# Patient Record
Sex: FEMALE | Race: WHITE | Employment: OTHER | URBAN - METROPOLITAN AREA
[De-identification: names, ages, dates, MRNs, and addresses within clinical notes are randomized per-mention and may not be internally consistent; named-entity substitution may affect disease eponyms.]

---

## 2017-03-06 ENCOUNTER — TELEPHONE (OUTPATIENT)
Dept: FAMILY MEDICINE CLINIC | Age: 67
End: 2017-03-06

## 2017-03-06 DIAGNOSIS — E11.9 CONTROLLED TYPE 2 DIABETES MELLITUS WITHOUT COMPLICATION, WITHOUT LONG-TERM CURRENT USE OF INSULIN (HCC): Primary | ICD-10-CM

## 2017-03-08 LAB — HBA1C MFR BLD: 7.2 %

## 2017-03-21 DIAGNOSIS — E11.9 CONTROLLED TYPE 2 DIABETES MELLITUS WITHOUT COMPLICATION, WITHOUT LONG-TERM CURRENT USE OF INSULIN (HCC): Primary | ICD-10-CM

## 2017-03-22 ENCOUNTER — OFFICE VISIT (OUTPATIENT)
Dept: FAMILY MEDICINE CLINIC | Age: 67
End: 2017-03-22

## 2017-03-22 VITALS
BODY MASS INDEX: 47.09 KG/M2 | WEIGHT: 293 LBS | HEART RATE: 86 BPM | HEIGHT: 66 IN | RESPIRATION RATE: 12 BRPM | SYSTOLIC BLOOD PRESSURE: 134 MMHG | DIASTOLIC BLOOD PRESSURE: 76 MMHG | TEMPERATURE: 96.1 F

## 2017-03-22 DIAGNOSIS — E11.9 CONTROLLED TYPE 2 DIABETES MELLITUS WITHOUT COMPLICATION, WITHOUT LONG-TERM CURRENT USE OF INSULIN (HCC): Primary | ICD-10-CM

## 2017-03-22 DIAGNOSIS — E78.2 MIXED HYPERLIPIDEMIA: ICD-10-CM

## 2017-03-22 DIAGNOSIS — I10 ESSENTIAL HYPERTENSION: ICD-10-CM

## 2017-03-22 PROCEDURE — G8484 FLU IMMUNIZE NO ADMIN: HCPCS | Performed by: FAMILY MEDICINE

## 2017-03-22 PROCEDURE — 4040F PNEUMOC VAC/ADMIN/RCVD: CPT | Performed by: FAMILY MEDICINE

## 2017-03-22 PROCEDURE — 1123F ACP DISCUSS/DSCN MKR DOCD: CPT | Performed by: FAMILY MEDICINE

## 2017-03-22 PROCEDURE — G8419 CALC BMI OUT NRM PARAM NOF/U: HCPCS | Performed by: FAMILY MEDICINE

## 2017-03-22 PROCEDURE — 1090F PRES/ABSN URINE INCON ASSESS: CPT | Performed by: FAMILY MEDICINE

## 2017-03-22 PROCEDURE — 3014F SCREEN MAMMO DOC REV: CPT | Performed by: FAMILY MEDICINE

## 2017-03-22 PROCEDURE — 1036F TOBACCO NON-USER: CPT | Performed by: FAMILY MEDICINE

## 2017-03-22 PROCEDURE — 3045F PR MOST RECENT HEMOGLOBIN A1C LEVEL 7.0-9.0%: CPT | Performed by: FAMILY MEDICINE

## 2017-03-22 PROCEDURE — 3017F COLORECTAL CA SCREEN DOC REV: CPT | Performed by: FAMILY MEDICINE

## 2017-03-22 PROCEDURE — 99213 OFFICE O/P EST LOW 20 MIN: CPT | Performed by: FAMILY MEDICINE

## 2017-03-22 PROCEDURE — G8400 PT W/DXA NO RESULTS DOC: HCPCS | Performed by: FAMILY MEDICINE

## 2017-03-22 PROCEDURE — G8427 DOCREV CUR MEDS BY ELIG CLIN: HCPCS | Performed by: FAMILY MEDICINE

## 2017-03-22 RX ORDER — DIAZEPAM 5 MG/1
TABLET ORAL
Qty: 30 TABLET | Refills: 3 | Status: SHIPPED | OUTPATIENT
Start: 2017-03-22 | End: 2018-02-20 | Stop reason: ALTCHOICE

## 2017-03-22 RX ORDER — BENAZEPRIL HYDROCHLORIDE 20 MG/1
TABLET ORAL
Qty: 90 TABLET | Refills: 3 | Status: SHIPPED | OUTPATIENT
Start: 2017-03-22 | End: 2018-01-19 | Stop reason: SDUPTHER

## 2017-03-22 ASSESSMENT — ENCOUNTER SYMPTOMS
SINUS PRESSURE: 0
DIARRHEA: 0
SORE THROAT: 0
EYES NEGATIVE: 1
ABDOMINAL PAIN: 0
COUGH: 0
SHORTNESS OF BREATH: 0
CONSTIPATION: 0

## 2017-04-03 ENCOUNTER — TELEPHONE (OUTPATIENT)
Dept: FAMILY MEDICINE CLINIC | Age: 67
End: 2017-04-03

## 2017-04-03 RX ORDER — BENZONATATE 100 MG/1
100 CAPSULE ORAL 3 TIMES DAILY PRN
Qty: 30 CAPSULE | Refills: 0 | Status: SHIPPED | OUTPATIENT
Start: 2017-04-03 | End: 2017-04-10

## 2017-04-03 RX ORDER — AZITHROMYCIN 250 MG/1
TABLET, FILM COATED ORAL
Qty: 6 TABLET | Refills: 0 | Status: SHIPPED | OUTPATIENT
Start: 2017-04-03 | End: 2017-11-13 | Stop reason: ALTCHOICE

## 2017-04-11 RX ORDER — OMEPRAZOLE 20 MG/1
CAPSULE, DELAYED RELEASE ORAL
Qty: 180 CAPSULE | Refills: 0 | Status: SHIPPED | OUTPATIENT
Start: 2017-04-11 | End: 2017-07-27 | Stop reason: SDUPTHER

## 2017-04-28 RX ORDER — CITALOPRAM 20 MG/1
TABLET ORAL
Qty: 90 TABLET | Refills: 0 | Status: SHIPPED | OUTPATIENT
Start: 2017-04-28 | End: 2017-11-13 | Stop reason: SDUPTHER

## 2017-07-10 RX ORDER — PIOGLITAZONEHYDROCHLORIDE 45 MG/1
TABLET ORAL
Qty: 90 TABLET | Refills: 0 | Status: SHIPPED | OUTPATIENT
Start: 2017-07-10

## 2017-07-27 RX ORDER — OMEPRAZOLE 20 MG/1
CAPSULE, DELAYED RELEASE ORAL
Qty: 180 CAPSULE | Refills: 1 | Status: SHIPPED | OUTPATIENT
Start: 2017-07-27 | End: 2018-02-23 | Stop reason: SDUPTHER

## 2017-08-11 RX ORDER — GLIMEPIRIDE 4 MG/1
TABLET ORAL
Qty: 90 TABLET | Refills: 1 | Status: SHIPPED | OUTPATIENT
Start: 2017-08-11 | End: 2018-09-16 | Stop reason: SDUPTHER

## 2017-08-11 RX ORDER — CITALOPRAM 20 MG/1
TABLET ORAL
Qty: 90 TABLET | Refills: 1 | Status: SHIPPED | OUTPATIENT
Start: 2017-08-11 | End: 2018-03-09 | Stop reason: SDUPTHER

## 2017-09-08 RX ORDER — DIAZEPAM 5 MG/1
TABLET ORAL
Qty: 30 TABLET | Refills: 0 | OUTPATIENT
Start: 2017-09-08

## 2017-09-08 RX ORDER — LORAZEPAM 0.5 MG/1
TABLET ORAL
Qty: 30 TABLET | Refills: 0 | OUTPATIENT
Start: 2017-09-08

## 2017-11-13 ENCOUNTER — OFFICE VISIT (OUTPATIENT)
Dept: FAMILY MEDICINE CLINIC | Age: 67
End: 2017-11-13

## 2017-11-13 VITALS
RESPIRATION RATE: 20 BRPM | TEMPERATURE: 97.5 F | HEART RATE: 88 BPM | HEIGHT: 66 IN | SYSTOLIC BLOOD PRESSURE: 114 MMHG | BODY MASS INDEX: 47.09 KG/M2 | WEIGHT: 293 LBS | DIASTOLIC BLOOD PRESSURE: 82 MMHG

## 2017-11-13 DIAGNOSIS — E66.01 MORBID OBESITY (HCC): ICD-10-CM

## 2017-11-13 DIAGNOSIS — H65.93 SEROMUCINOUS OTITIS MEDIA OF BOTH EARS: ICD-10-CM

## 2017-11-13 DIAGNOSIS — I10 ESSENTIAL HYPERTENSION: Primary | ICD-10-CM

## 2017-11-13 DIAGNOSIS — E78.2 MIXED HYPERLIPIDEMIA: ICD-10-CM

## 2017-11-13 DIAGNOSIS — L30.9 ECZEMA, UNSPECIFIED TYPE: ICD-10-CM

## 2017-11-13 DIAGNOSIS — F32.0 MILD SINGLE CURRENT EPISODE OF MAJOR DEPRESSIVE DISORDER (HCC): ICD-10-CM

## 2017-11-13 DIAGNOSIS — Z12.11 SPECIAL SCREENING FOR MALIGNANT NEOPLASM OF COLON: ICD-10-CM

## 2017-11-13 DIAGNOSIS — Z12.11 COLON CANCER SCREENING: ICD-10-CM

## 2017-11-13 DIAGNOSIS — E11.9 CONTROLLED TYPE 2 DIABETES MELLITUS WITHOUT COMPLICATION, WITHOUT LONG-TERM CURRENT USE OF INSULIN (HCC): ICD-10-CM

## 2017-11-13 DIAGNOSIS — R25.1 TREMOR: ICD-10-CM

## 2017-11-13 PROCEDURE — G8417 CALC BMI ABV UP PARAM F/U: HCPCS | Performed by: FAMILY MEDICINE

## 2017-11-13 PROCEDURE — 3017F COLORECTAL CA SCREEN DOC REV: CPT | Performed by: FAMILY MEDICINE

## 2017-11-13 PROCEDURE — 4040F PNEUMOC VAC/ADMIN/RCVD: CPT | Performed by: FAMILY MEDICINE

## 2017-11-13 PROCEDURE — 1123F ACP DISCUSS/DSCN MKR DOCD: CPT | Performed by: FAMILY MEDICINE

## 2017-11-13 PROCEDURE — 99214 OFFICE O/P EST MOD 30 MIN: CPT | Performed by: FAMILY MEDICINE

## 2017-11-13 PROCEDURE — 3014F SCREEN MAMMO DOC REV: CPT | Performed by: FAMILY MEDICINE

## 2017-11-13 PROCEDURE — 1090F PRES/ABSN URINE INCON ASSESS: CPT | Performed by: FAMILY MEDICINE

## 2017-11-13 PROCEDURE — G8484 FLU IMMUNIZE NO ADMIN: HCPCS | Performed by: FAMILY MEDICINE

## 2017-11-13 PROCEDURE — G8400 PT W/DXA NO RESULTS DOC: HCPCS | Performed by: FAMILY MEDICINE

## 2017-11-13 PROCEDURE — 1036F TOBACCO NON-USER: CPT | Performed by: FAMILY MEDICINE

## 2017-11-13 PROCEDURE — G8427 DOCREV CUR MEDS BY ELIG CLIN: HCPCS | Performed by: FAMILY MEDICINE

## 2017-11-13 PROCEDURE — 3045F PR MOST RECENT HEMOGLOBIN A1C LEVEL 7.0-9.0%: CPT | Performed by: FAMILY MEDICINE

## 2017-11-13 RX ORDER — DIAZEPAM 5 MG/1
TABLET ORAL
Qty: 30 TABLET | Refills: 2 | Status: SHIPPED | OUTPATIENT
Start: 2017-11-13 | End: 2018-02-10 | Stop reason: SDUPTHER

## 2017-11-13 RX ORDER — PRIMIDONE 50 MG/1
TABLET ORAL
Qty: 60 TABLET | Refills: 3 | Status: SHIPPED | OUTPATIENT
Start: 2017-11-13 | End: 2018-06-21 | Stop reason: ALTCHOICE

## 2017-11-13 RX ORDER — CEFUROXIME AXETIL 250 MG/1
250 TABLET ORAL 2 TIMES DAILY
Qty: 20 TABLET | Refills: 0 | Status: SHIPPED | OUTPATIENT
Start: 2017-11-13 | End: 2017-11-23

## 2017-11-13 RX ORDER — LORAZEPAM 0.5 MG/1
TABLET ORAL
Qty: 30 TABLET | Refills: 3 | Status: SHIPPED | OUTPATIENT
Start: 2017-11-13 | End: 2018-08-15 | Stop reason: SDUPTHER

## 2017-11-13 RX ORDER — TRIAMCINOLONE ACETONIDE 1 MG/G
CREAM TOPICAL
Qty: 45 G | Refills: 1 | Status: SHIPPED | OUTPATIENT
Start: 2017-11-13

## 2017-11-13 ASSESSMENT — ENCOUNTER SYMPTOMS
DIARRHEA: 0
EYES NEGATIVE: 1
ABDOMINAL PAIN: 0
SHORTNESS OF BREATH: 0
NAUSEA: 0
CONSTIPATION: 0
COUGH: 0

## 2017-11-13 NOTE — PROGRESS NOTES
Subjective  Rosalie Putnam, 79 y.o. female presents today with:  Chief Complaint   Patient presents with    Diabetes     Patient presents today for a follow-up on Diabetes. Tries to follow a low sugar diet. Does not check her sugar at home.  Ear Fullness     Also complaining of left ear fullness. States her ear constantly \"crackles\", and \"pops\" when she moves her jaw.  Eczema     Notices she has a lot of dry skin around her hair line, face, ears, and arms. Wondering what she can use? HPI    Patient presents today for a follow-up diabetes   Taking current medications which were reviewed. Problem list discussed. Eating okay. Tries to stay active    Overall doing well. Has 2 new problem / question. Visit dry rash around her hairline. Also complaining of some ear fullness    Health Maintenance Is Up-To-Date         No other questions and or concerns for today's visit    Declines Influenza Vaccine. Review of Systems   Constitutional: Negative for activity change, appetite change, fatigue and fever. HENT: Negative for ear pain. Eyes: Negative. Respiratory: Negative for cough and shortness of breath. Cardiovascular: Negative for chest pain and palpitations. Gastrointestinal: Negative for abdominal pain, constipation, diarrhea and nausea. Genitourinary: Negative for dysuria and frequency. Neurological: Negative for dizziness and headaches.          Past Medical History:   Diagnosis Date    Depression     GERD (gastroesophageal reflux disease)     Hyperlipidemia     Hypertension     Obesity     Type II or unspecified type diabetes mellitus without mention of complication, not stated as uncontrolled      Past Surgical History:   Procedure Laterality Date    CARDIAC CATHETERIZATION      SPINE SURGERY       Social History     Social History    Marital status:      Spouse name: N/A    Number of children: 3    Years of education: N/A     Occupational History   Retired     Social History Main Topics    Smoking status: Former Smoker     Types: Cigarettes     Start date: 1/1/1974     Quit date: 3/11/2003    Smokeless tobacco: Never Used    Alcohol use No    Drug use: No    Sexual activity: No     Other Topics Concern    Not on file     Social History Narrative    No narrative on file     Family History   Problem Relation Age of Onset    Cancer Mother      Allergies   Allergen Reactions    Other      SPORONAX     Current Outpatient Prescriptions   Medication Sig Dispense Refill    glimepiride (AMARYL) 4 MG tablet TAKE 1 TABLET BY MOUTH EVERY MORNING 90 tablet 1    citalopram (CELEXA) 20 MG tablet TAKE 1 TABLET BY MOUTH EVERY DAY 90 tablet 1    omeprazole (PRILOSEC) 20 MG delayed release capsule TAKE 2 CAPSULES BY MOUTH EVERY  capsule 1    pioglitazone (ACTOS) 45 MG tablet TAKE 1 TABLET BY MOUTH EVERY DAY 90 tablet 0    benazepril (LOTENSIN) 20 MG tablet TAKE 1 TABLET BY MOUTH EVERY DAY 90 tablet 3    glucose blood VI test strips (ONETOUCH VERIO) strip TEST  each 3    diazepam (VALIUM) 5 MG tablet TAKE ONE TABLET BY MOUTH EVERY EIGHT HOURS AS NEEDED 30 tablet 3    ibuprofen (ADVIL;MOTRIN) 400 MG tablet Take 400 mg by mouth every 4 hours as needed for Pain       No current facility-administered medications for this visit. PMH, Surgical Hx, Family Hx, and Social Hx reviewed and updated. Health Maintenance reviewed. Objective    Vitals:    11/13/17 1407   BP: 114/82   Pulse: 88   Resp: 20   Temp: 97.5 °F (36.4 °C)   TempSrc: Temporal   Weight: (!) 334 lb (151.5 kg)   Height: 5' 6.25\" (1.683 m)       Physical Exam   Constitutional: She appears well-nourished. No distress. HENT:   Nose: Nose normal.   Mouth/Throat: Oropharynx is clear and moist.   Moderate ear wax in the right ear canal which was removed with curette. She tolerated the procedure well   Eyes: Conjunctivae are normal. Pupils are equal, round, and reactive to light. Neck: Normal range of motion. Neck supple. No JVD present. Carotid bruit is not present. No thyromegaly present. Cardiovascular: Normal rate, regular rhythm, normal heart sounds and normal pulses. No murmur heard. Pulmonary/Chest: Effort normal and breath sounds normal. She has no wheezes. Abdominal: Soft. Bowel sounds are normal. She exhibits no distension and no mass. There is no hepatomegaly. There is no tenderness. Musculoskeletal: Normal range of motion. She exhibits no edema. OA changes hands and knees   Lymphadenopathy:     She has no cervical adenopathy. Neurological: She is alert. Skin: Skin is warm. Rash noted. No cyanosis. Dry scaly eczema changes around hairline area   Psychiatric: She has a normal mood and affect. Her speech is normal.       Assessment & Plan   1. Essential hypertension controlled CBC With Auto Differential    Comprehensive Metabolic Panel    Lipid Panel   2. Morbid obesity ([address]     3. Controlled type 2 diabetes mellitus without complication, without long-term current use of insulin (HCC) sstable Microalbumin / Creatinine Urine Ratio    CBC With Auto Differential    Comprehensive Metabolic Panel    Lipid Panel    Hemoglobin A1C   4. Mixed hyperlipidemia  CBC With Auto Differential    Comprehensive Metabolic Panel    Lipid Panel   5. Mild single current episode of major depressive disorder ([address]     6. Colon cancer screening  POCT Fecal Immunochemical Test (FIT)   7. Special screening for malignant neoplasm of colon  [address] [place] [address] - Gastroenterology Lillian Cowper)     No orders of the defined types were placed in this encounter. No orders of the defined types were placed in this encounter.     Medications Discontinued During This Encounter   Medication Reason    azithromycin (ZITHROMAX Z-AUGUSTO) 250 MG tablet Therapy completed    citalopram (CELEXA) 20 MG tablet Duplicate Order    citalopram (CELEXA) 20 MG tablet Duplicate Order    glimepiride

## 2017-11-13 NOTE — PATIENT INSTRUCTIONS
Hypertension / Hyperlipidemia Management    Blood Pressure Log      Tips to manage your condition    1. Keep your blood pressure below 130/80   Make sure your blood pressure is measured at every office visit    2. If you take antihypertensive drug therapy return for follow-up monthly until B/P goal is reached. 3. Follow-up with your physician to have your potassium and creatinine measured every 6 months. 4. Measure your blood pressure at home (use log to track your progress). 5. Keep your LDL (bad) cholesterol level below 130   Make sure your lipids are measured once a year     6. If you take LDL-lowering drug therapy return for follow-up every 6 months    Tips for healthy living    1. Start your day with breakfast  2. Exercise and slowly progress to (brisk walking, bike riding, etc) 30 minutes 3 to 5 days a week. 3. Snack in moderation (limit eating sugary or salty foods to no more than 3 times a week). 4. Eat more grains and vegetables (have no more than 3 servings of fruit daily). 5. Avoid tobacco use. 6. Drink alcohol in moderation (no more than 1 serving daily for woman and no more than 2 servings daily for men)  7. Obtain annual flu shot  8. Refer to patient education handouts given to you today. [facility] Address Phone Website   Jedenedina [facility]  Dept. [address] [phone] [facility] [email or web address]       Weight Management Community Resources   Organization Address Phone Website   [facility] ([facility]) [address] [facility] [address] [facility] [phone] n/a   Weight Watchers Multiple meeting locations throughout [address] [place], [place] [address] [email or web address]     Tops n/a n/a [email or web address]. [email or web address] [address] [facility]  Jenni Bethea, [facility] [address] [phone] [email or web address]    [facility] [address] [place] [address] [facility] [phone] [facility]    [address] [facility], [address] [phone]        Diabetes Management    Blood Sugar Log  Day Breakfast Lunch Dinner    Before 2 hrs After Before 2 hrs After Before 2 hrs After    Time Number Time  Number Time Number Time Number Time  Number Time Number   Sun               Mon               Tues               Wed               Thurs               Fri               Sat                 2. Keep your blood sugar level (A1c) below 7.0%   If your blood sugar is ABOVE 7.0%, get an A1c test every 3 months.  If your blood sugar is BELOW 7.0%, get an A1c test every 6 months. 3. Keep your blood pressure below 130/80   Make sure your blood pressure is measured at every office visit    4. Obtain a foot exam once a year during your doctors visit; also, do foot exam on yourself every week or have someone do it for you     5. Keep your LDL (bad) cholesterol level below 100   Make sure your lipids are measured once a year     6. Obtain a urine test (microalbumin) once a year    7. Obtain an eye exam once a year    8. Learn to stay healthy living with Diabetes   Attend Diabetes Education course if ordered by your physician    9. Exercise and slowly progress to (brisk walking, bike riding, etc) 30 minutes 3 to 5 days a week. 10. Obtain annual flu shot    11. Obtain pneumonia shot if you have not done so  12. Refer to patient education handouts given to you today    Diabetes Management Community Resources   Organization Address Program Phone / [address] Diabetes Self-Management  [phone]   [facility] [address] [place], [place] Diabetes Self-Management  [address] [place] [address]  Diabetes Self-Management  [address] Help with Food [phone]   Partnership for Prescription Assistance n/a

## 2017-11-24 ENCOUNTER — TELEPHONE (OUTPATIENT)
Dept: FAMILY MEDICINE CLINIC | Age: 67
End: 2017-11-24

## 2017-11-24 RX ORDER — FLUCONAZOLE 150 MG/1
150 TABLET ORAL ONCE
Qty: 1 TABLET | Refills: 1 | Status: SHIPPED | OUTPATIENT
Start: 2017-11-24 | End: 2017-11-24

## 2017-11-24 RX ORDER — FLUCONAZOLE 150 MG/1
150 TABLET ORAL ONCE
Qty: 1 TABLET | Refills: 1 | Status: CANCELLED | OUTPATIENT
Start: 2017-11-24 | End: 2017-11-24

## 2017-11-24 NOTE — TELEPHONE ENCOUNTER
Pt called asking if she can have a rx for Diflucan? She seen Dr Ashley Camargo on 11/13/2017 was given an antibiotic. She now has a yeast infection that she tried to treat herself but hasn't helped.   She said she can not wait until Monday when Dr Ashley Camargo returns

## 2017-11-29 RX ORDER — PIOGLITAZONEHYDROCHLORIDE 45 MG/1
TABLET ORAL
Qty: 90 TABLET | Refills: 1 | Status: SHIPPED | OUTPATIENT
Start: 2017-11-29 | End: 2018-02-10 | Stop reason: ALTCHOICE

## 2017-12-22 RX ORDER — ATORVASTATIN CALCIUM 10 MG/1
10 TABLET, FILM COATED ORAL DAILY
Qty: 90 TABLET | Refills: 1 | Status: SHIPPED | OUTPATIENT
Start: 2017-12-22 | End: 2018-09-27 | Stop reason: SDUPTHER

## 2018-01-18 ENCOUNTER — TELEPHONE (OUTPATIENT)
Dept: FAMILY MEDICINE CLINIC | Age: 68
End: 2018-01-18

## 2018-01-18 RX ORDER — AZITHROMYCIN 250 MG/1
TABLET, FILM COATED ORAL
Qty: 6 TABLET | Refills: 0 | Status: SHIPPED | OUTPATIENT
Start: 2018-01-18 | End: 2018-02-10 | Stop reason: ALTCHOICE

## 2018-01-18 NOTE — TELEPHONE ENCOUNTER
Patient called and stated that she has been sick for 8 days. She has had a sore throat, congestion, drainage, cough, and also had a fever for 2 days which has broke. Would like something called into [facility] in [facility].     Please advise

## 2018-01-19 RX ORDER — BENAZEPRIL HYDROCHLORIDE 20 MG/1
TABLET ORAL
Qty: 90 TABLET | Refills: 1 | Status: SHIPPED | OUTPATIENT
Start: 2018-01-19 | End: 2019-03-04 | Stop reason: SDUPTHER

## 2018-01-26 LAB
AVERAGE GLUCOSE: NORMAL
HBA1C MFR BLD: 7.5 %

## 2018-01-27 LAB
CREATININE, URINE: 109.8
MICROALBUMIN/CREAT 24H UR: 42 MG/G{CREAT}
MICROALBUMIN/CREAT UR-RTO: 109.8

## 2018-01-31 DIAGNOSIS — E11.9 CONTROLLED TYPE 2 DIABETES MELLITUS WITHOUT COMPLICATION, WITHOUT LONG-TERM CURRENT USE OF INSULIN (HCC): ICD-10-CM

## 2018-02-02 ENCOUNTER — TELEPHONE (OUTPATIENT)
Dept: FAMILY MEDICINE CLINIC | Age: 68
End: 2018-02-02

## 2018-02-02 NOTE — TELEPHONE ENCOUNTER
Please let her know urine was okay and A1c was 7.5 indicating good sugar control  Keep up the good work and see me in about 4 months

## 2018-02-10 ENCOUNTER — OFFICE VISIT (OUTPATIENT)
Dept: FAMILY MEDICINE CLINIC | Age: 68
End: 2018-02-10
Payer: MEDICARE

## 2018-02-10 VITALS
TEMPERATURE: 97.1 F | HEIGHT: 66 IN | DIASTOLIC BLOOD PRESSURE: 86 MMHG | HEART RATE: 103 BPM | SYSTOLIC BLOOD PRESSURE: 130 MMHG | WEIGHT: 293 LBS | BODY MASS INDEX: 47.09 KG/M2 | OXYGEN SATURATION: 93 % | RESPIRATION RATE: 15 BRPM

## 2018-02-10 DIAGNOSIS — B96.89 ACUTE BACTERIAL SINUSITIS: ICD-10-CM

## 2018-02-10 DIAGNOSIS — Z12.11 SCREENING FOR COLON CANCER: ICD-10-CM

## 2018-02-10 DIAGNOSIS — E66.01 MORBID OBESITY WITH BMI OF 50.0-59.9, ADULT (HCC): ICD-10-CM

## 2018-02-10 DIAGNOSIS — J01.90 ACUTE BACTERIAL SINUSITIS: ICD-10-CM

## 2018-02-10 DIAGNOSIS — E08.21 DIABETES MELLITUS DUE TO UNDERLYING CONDITION WITH DIABETIC NEPHROPATHY, WITHOUT LONG-TERM CURRENT USE OF INSULIN (HCC): ICD-10-CM

## 2018-02-10 DIAGNOSIS — J20.9 ACUTE BRONCHITIS, UNSPECIFIED ORGANISM: Primary | ICD-10-CM

## 2018-02-10 DIAGNOSIS — F32.5 MAJOR DEPRESSION, SINGLE EPISODE, IN COMPLETE REMISSION (HCC): ICD-10-CM

## 2018-02-10 PROCEDURE — 1090F PRES/ABSN URINE INCON ASSESS: CPT | Performed by: FAMILY MEDICINE

## 2018-02-10 PROCEDURE — 1123F ACP DISCUSS/DSCN MKR DOCD: CPT | Performed by: FAMILY MEDICINE

## 2018-02-10 PROCEDURE — G8484 FLU IMMUNIZE NO ADMIN: HCPCS | Performed by: FAMILY MEDICINE

## 2018-02-10 PROCEDURE — G8417 CALC BMI ABV UP PARAM F/U: HCPCS | Performed by: FAMILY MEDICINE

## 2018-02-10 PROCEDURE — 3014F SCREEN MAMMO DOC REV: CPT | Performed by: FAMILY MEDICINE

## 2018-02-10 PROCEDURE — 99213 OFFICE O/P EST LOW 20 MIN: CPT | Performed by: FAMILY MEDICINE

## 2018-02-10 PROCEDURE — 4040F PNEUMOC VAC/ADMIN/RCVD: CPT | Performed by: FAMILY MEDICINE

## 2018-02-10 PROCEDURE — 1036F TOBACCO NON-USER: CPT | Performed by: FAMILY MEDICINE

## 2018-02-10 PROCEDURE — G8510 SCR DEP NEG, NO PLAN REQD: HCPCS | Performed by: FAMILY MEDICINE

## 2018-02-10 PROCEDURE — 3017F COLORECTAL CA SCREEN DOC REV: CPT | Performed by: FAMILY MEDICINE

## 2018-02-10 PROCEDURE — G8400 PT W/DXA NO RESULTS DOC: HCPCS | Performed by: FAMILY MEDICINE

## 2018-02-10 PROCEDURE — G8427 DOCREV CUR MEDS BY ELIG CLIN: HCPCS | Performed by: FAMILY MEDICINE

## 2018-02-10 RX ORDER — CEFUROXIME AXETIL 250 MG/1
250 TABLET ORAL 2 TIMES DAILY
Qty: 20 TABLET | Refills: 0 | Status: SHIPPED | OUTPATIENT
Start: 2018-02-10 | End: 2018-02-20 | Stop reason: ALTCHOICE

## 2018-02-10 RX ORDER — BENZONATATE 100 MG/1
100 CAPSULE ORAL 3 TIMES DAILY PRN
Qty: 20 CAPSULE | Refills: 1 | Status: SHIPPED | OUTPATIENT
Start: 2018-02-10 | End: 2018-02-20 | Stop reason: ALTCHOICE

## 2018-02-10 ASSESSMENT — PATIENT HEALTH QUESTIONNAIRE - PHQ9
2. FEELING DOWN, DEPRESSED OR HOPELESS: 0
SUM OF ALL RESPONSES TO PHQ QUESTIONS 1-9: 0
1. LITTLE INTEREST OR PLEASURE IN DOING THINGS: 0
SUM OF ALL RESPONSES TO PHQ9 QUESTIONS 1 & 2: 0

## 2018-02-10 ASSESSMENT — ENCOUNTER SYMPTOMS
EYES NEGATIVE: 1
NAUSEA: 0
SHORTNESS OF BREATH: 0
DIARRHEA: 0
ABDOMINAL PAIN: 0
CONSTIPATION: 0
COUGH: 1

## 2018-02-10 NOTE — PROGRESS NOTES
Subjective  Drake Asa, 76 y.o. female presents today with:  Chief Complaint   Patient presents with    Congestion     pt complains of nasal congestion, started tuesday,, pt has tried mucinex dm with some relief     Cough     pt complains of cough, pt has not tried anything for this           HPI    Patient presents today Complaining of sinus congestion symptoms with mostly dry cough coming on for about 5 days. Denies diarrhea or vomiting. Says the cough is the worst thing especially at night when she is trying to sleep    Taking current medications which were reviewed. Problem list discussed. Eating okay. Overall doing well. Has no other new problem / question. Health Maintenance Is Up-To-Date         No other questions and or concerns for today's visit      Review of Systems   Constitutional: Positive for chills. Negative for activity change, appetite change, fatigue and fever. HENT: Positive for congestion. Negative for ear pain. Eyes: Negative. Respiratory: Positive for cough. Negative for shortness of breath. Cardiovascular: Negative for chest pain and palpitations. Gastrointestinal: Negative for abdominal pain, constipation, diarrhea and nausea. Genitourinary: Negative for dysuria and frequency. Neurological: Negative for dizziness and headaches.          Past Medical History:   Diagnosis Date    Depression     GERD (gastroesophageal reflux disease)     Hyperlipidemia     Hypertension     Obesity     Type II or unspecified type diabetes mellitus without mention of complication, not stated as uncontrolled      Past Surgical History:   Procedure Laterality Date    CARDIAC CATHETERIZATION      SPINE SURGERY       Social History     Social History    Marital status:      Spouse name: N/A    Number of children: 3    Years of education: N/A     Occupational History     Retired     Social History Main Topics    Smoking status: Former Smoker     Types: Cigarettes     Start date: 1/1/1974     Quit date: 3/11/2003    Smokeless tobacco: Never Used    Alcohol use No    Drug use: No    Sexual activity: No     Other Topics Concern    Not on file     Social History Narrative    No narrative on file     Family History   Problem Relation Age of Onset    Cancer Mother      Allergies   Allergen Reactions    Other      SPORONAX     Current Outpatient Prescriptions   Medication Sig Dispense Refill    cefUROXime (CEFTIN) 250 MG tablet Take 1 tablet by mouth 2 times daily for 10 days 20 tablet 0    benzonatate (TESSALON PERLES) 100 MG capsule Take 1 capsule by mouth 3 times daily as needed for Cough 20 capsule 1    benazepril (LOTENSIN) 20 MG tablet TAKE 1 TABLET BY MOUTH EVERY DAY 90 tablet 1    atorvastatin (LIPITOR) 10 MG tablet TAKE 1 TABLET BY MOUTH DAILY 90 tablet 1    LORazepam (ATIVAN) 0.5 MG tablet TAKE 1 TABLET EVERY 6 HOURS AS NEEDED. 30 tablet 3    glucose blood VI test strips (ONETOUCH VERIO) strip TEST  each 3    primidone (MYSOLINE) 50 MG tablet BID FOR TREMOR 60 tablet 3    triamcinolone (KENALOG) 0.1 % cream Apply topically 2 times daily. 45 g 1    glimepiride (AMARYL) 4 MG tablet TAKE 1 TABLET BY MOUTH EVERY MORNING 90 tablet 1    citalopram (CELEXA) 20 MG tablet TAKE 1 TABLET BY MOUTH EVERY DAY 90 tablet 1    omeprazole (PRILOSEC) 20 MG delayed release capsule TAKE 2 CAPSULES BY MOUTH EVERY  capsule 1    pioglitazone (ACTOS) 45 MG tablet TAKE 1 TABLET BY MOUTH EVERY DAY 90 tablet 0    diazepam (VALIUM) 5 MG tablet TAKE ONE TABLET BY MOUTH EVERY EIGHT HOURS AS NEEDED 30 tablet 3    ibuprofen (ADVIL;MOTRIN) 400 MG tablet Take 400 mg by mouth every 4 hours as needed for Pain       No current facility-administered medications for this visit. PMH, Surgical Hx, Family Hx, and Social Hx reviewed and updated. Health Maintenance reviewed.     Objective    Vitals:    02/10/18 1036   BP: 130/86   Pulse: 103   Resp: 15   Temp: 97.1 °F (36.2 °C)   TempSrc: Temporal   SpO2: 93%   Weight: (!) 329 lb (149.2 kg)   Height: 5' 6.25\" (1.683 m)       Physical Exam   Constitutional: She appears well-developed. No distress. HENT:   Head: Normocephalic. Right Ear: Hearing and tympanic membrane normal. No swelling. Left Ear: Hearing and tympanic membrane normal.   Nose: Rhinorrhea present. Right sinus exhibits maxillary sinus tenderness. Left sinus exhibits maxillary sinus tenderness. Mouth/Throat: Posterior oropharyngeal erythema present. Eyes: EOM are normal. Pupils are equal, round, and reactive to light. Neck: Normal range of motion. Neck supple. No thyromegaly present. Cardiovascular: Normal rate, regular rhythm and normal heart sounds. No murmur heard. Pulmonary/Chest: Effort normal. She has no wheezes. Mild upper airway rhonchi   Abdominal: Soft. Bowel sounds are normal. There is no tenderness. Musculoskeletal: She exhibits no edema. Lymphadenopathy:     She has no cervical adenopathy. Skin: No rash noted. Assessment & Plan   1. Acute bronchitis, unspecified organism  cefUROXime (CEFTIN) 250 MG tablet    benzonatate (TESSALON PERLES) 100 MG capsule   2. Screening for colon cancer  [address] [place] [address])   3.  Acute bacterial sinusitis       Orders Placed This Encounter   Procedures   [address] [place] [address])     Referral Priority:   Routine     Referral Type:   Consult for Advice and Opinion     Referral Reason:   Specialty Services Required     Requested Specialty:   Gastroenterology     Number of Visits Requested:   1     Orders Placed This Encounter   Medications    cefUROXime (CEFTIN) 250 MG tablet     Sig: Take 1 tablet by mouth 2 times daily for 10 days     Dispense:  20 tablet     Refill:  0    benzonatate (TESSALON PERLES) 100 MG capsule     Sig: Take 1 capsule by mouth 3 times daily as needed for Cough     Dispense:  20 capsule     Refill:  1     Medications Discontinued During This Encounter   Medication Reason    diazepam (VALIUM) 5 MG tablet Duplicate Order    azithromycin (ZITHROMAX Z-AUGUSTO) 250 MG tablet Therapy completed    pioglitazone (ACTOS) 45 MG tablet Therapy completed     No Follow-up on file. Quality & Risk Score Accuracy - [employer]    Last edited 02/10/18 10:43 EST by Harjeet Lopez MD        Long talk. Over-the-counter cough and cold medication when necessary  Take medications as prescribed. Follow up as instructed. Call if any problems      Controlled Substances Monitoring:                                Harjeet Lopez MD          Please note this report has been partially produced using speech recognition software  And may cause contain errors related to that system including grammar, punctuation and spelling as well as words and phrases that may seem inappropriate. If there are questions or concerns please feel free to contact me to clarify.

## 2018-02-20 ENCOUNTER — OFFICE VISIT (OUTPATIENT)
Dept: FAMILY MEDICINE CLINIC | Age: 68
End: 2018-02-20
Payer: MEDICARE

## 2018-02-20 VITALS
WEIGHT: 293 LBS | RESPIRATION RATE: 16 BRPM | SYSTOLIC BLOOD PRESSURE: 136 MMHG | BODY MASS INDEX: 47.09 KG/M2 | DIASTOLIC BLOOD PRESSURE: 84 MMHG | HEIGHT: 66 IN | TEMPERATURE: 96.4 F | HEART RATE: 76 BPM

## 2018-02-20 DIAGNOSIS — J20.9 ACUTE BRONCHITIS, UNSPECIFIED ORGANISM: Primary | ICD-10-CM

## 2018-02-20 DIAGNOSIS — I10 ESSENTIAL HYPERTENSION: ICD-10-CM

## 2018-02-20 DIAGNOSIS — E11.9 CONTROLLED TYPE 2 DIABETES MELLITUS WITHOUT COMPLICATION, WITHOUT LONG-TERM CURRENT USE OF INSULIN (HCC): ICD-10-CM

## 2018-02-20 DIAGNOSIS — J06.9 ACUTE UPPER RESPIRATORY INFECTION: ICD-10-CM

## 2018-02-20 PROCEDURE — 3045F PR MOST RECENT HEMOGLOBIN A1C LEVEL 7.0-9.0%: CPT | Performed by: FAMILY MEDICINE

## 2018-02-20 PROCEDURE — G8427 DOCREV CUR MEDS BY ELIG CLIN: HCPCS | Performed by: FAMILY MEDICINE

## 2018-02-20 PROCEDURE — 3017F COLORECTAL CA SCREEN DOC REV: CPT | Performed by: FAMILY MEDICINE

## 2018-02-20 PROCEDURE — 99213 OFFICE O/P EST LOW 20 MIN: CPT | Performed by: FAMILY MEDICINE

## 2018-02-20 PROCEDURE — 1123F ACP DISCUSS/DSCN MKR DOCD: CPT | Performed by: FAMILY MEDICINE

## 2018-02-20 PROCEDURE — 1090F PRES/ABSN URINE INCON ASSESS: CPT | Performed by: FAMILY MEDICINE

## 2018-02-20 PROCEDURE — G8484 FLU IMMUNIZE NO ADMIN: HCPCS | Performed by: FAMILY MEDICINE

## 2018-02-20 PROCEDURE — G8400 PT W/DXA NO RESULTS DOC: HCPCS | Performed by: FAMILY MEDICINE

## 2018-02-20 PROCEDURE — 4040F PNEUMOC VAC/ADMIN/RCVD: CPT | Performed by: FAMILY MEDICINE

## 2018-02-20 PROCEDURE — 3014F SCREEN MAMMO DOC REV: CPT | Performed by: FAMILY MEDICINE

## 2018-02-20 PROCEDURE — 1036F TOBACCO NON-USER: CPT | Performed by: FAMILY MEDICINE

## 2018-02-20 PROCEDURE — G8417 CALC BMI ABV UP PARAM F/U: HCPCS | Performed by: FAMILY MEDICINE

## 2018-02-20 RX ORDER — GUAIFENESIN AND CODEINE PHOSPHATE 100; 10 MG/5ML; MG/5ML
10 SOLUTION ORAL EVERY 4 HOURS PRN
Qty: 180 ML | Refills: 0 | Status: SHIPPED | OUTPATIENT
Start: 2018-02-20 | End: 2018-06-21 | Stop reason: ALTCHOICE

## 2018-02-20 RX ORDER — PREDNISONE 10 MG/1
TABLET ORAL
Qty: 11 TABLET | Refills: 0 | Status: SHIPPED | OUTPATIENT
Start: 2018-02-20 | End: 2018-03-02

## 2018-02-20 RX ORDER — LEVOFLOXACIN 500 MG/1
500 TABLET, FILM COATED ORAL DAILY
Qty: 10 TABLET | Refills: 0 | Status: SHIPPED | OUTPATIENT
Start: 2018-02-20 | End: 2018-03-02

## 2018-02-20 ASSESSMENT — ENCOUNTER SYMPTOMS
NAUSEA: 0
COUGH: 1
SHORTNESS OF BREATH: 0
ABDOMINAL PAIN: 0
DIARRHEA: 1
EYES NEGATIVE: 1
WHEEZING: 1
CONSTIPATION: 0

## 2018-02-20 NOTE — LETTER
MEDICATION AGREEMENT     Vidal Blocker  [birth year]      For certain conditions, multiple classes of medications may be used to help better manage your symptoms, and to improve your ability to function at home, work and in social settings. However, these medications do have risks, which will be discussed with you, including addiction and dependency. The following prescribed medications need frequent monitoring and will require you to partner and assist in your healthcare. Medication  Dose, instructions and quantity as indicated on current prescription bottle Diagnosis/Reason(s) for Taking Category   LORazepam (ATIVAN) 0.5 MG tablet        Anxiety                            Benefits and goals of Controlled Substance Medications: There are two potential goals for your treatment: (1) decreased pain and suffering (2) improved daily life functions. There are many possible treatments for your chronic condition(s), and, in addition to controlled substance medications, we will try alternatives such as physical therapy, yoga, massage, home daily exercise, meditation, relaxation techniques, injections, chiropractic manipulations, surgery, cognitive therapy, hypnosis and many medications that are not habit-forming. Use of controlled substance medications may be helpful, but they are unlikely to resolve all of your symptoms or restore all function. Risks of Controlled Substance Medications:    Opioid pain medications: These medications can lead to problems such as addiction/dependence, sedation, lightheadedness/dizziness, memory issues, falls, constipation, nausea, or vomiting. They may also impair the ability to drive or operate machinery. Additionally, these medications may lower testosterone levels, leading to loss of bone strength, stamina and sex drive.   They may cause problems with breathing, sleep apnea and reduced coughing, which are especially dangerous for patients with lung disease. Overdose or dangerous interactions with alcohol and other medications may occur, leading to death. Hyperalgesia may develop, in which patients receiving opioids for the treatment of pain may actually become more sensitive to certain painful stimuli, and in some cases, experience pain from ordinarily non-painful stimuli. Women between the ages of 14-53 who could become pregnant should carefully weigh the risks and benefits of opioids with their physicians, as these medications increase the risk of pregnancy complications, including miscarriage,  delivery and stillbirth. It is also possible for babies to be born addicted to opioids. Opioid dependence withdrawal symptoms may include; feelings of uneasiness, increased pain, irritability, belly pain, diarrhea, sweats and goose-flesh. Benzodiazepines and non-benzodiazepine sleep medications: These medications can lead to problems such as addiction/dependence, sedation, fatigue, lightheadedness, dizziness, incoordination, falls, depression, hallucinations, and impaired judgment, memory and concentration. The ability to drive and operate machinery may also be affected. Abnormal sleep-related behaviors have been reported, including sleep walking, driving, making telephone calls, eating, or having sex while not fully awake. These medications can suppress breathing and worsen sleep apnea, particularly when combined with alcohol or other sedating medications, potentially leading to death. Dependence withdrawal symptoms may include tremors, anxiety, hallucinations and seizures. Stimulants:  Common adverse effects include addiction/dependence, increased blood pressure and heart rate, decreased appetite, nausea, involuntary weight loss, insomnia, irritability, and headaches.   These risks may increase when these medications are combined with other stimulants, such as caffeine pills or energy drinks, certain weight loss supplements and oral decongestants. Dependence withdrawal symptoms may include depressed mood, loss of interest, suicidal thoughts, anxiety, fatigue, appetite changes and agitation. Testosterone replacement therapy:  Potential side effects include increased risk of stroke and heart attack, blood clots, increased blood pressure, increased cholesterol, enlarged prostate, sleep apnea, irritability/aggression and other mood disorders, and decreased fertility. Other:     1. I understand that I have the following responsibilities:  · I will take medications at the dose and frequency prescribed. · I will not increase or change how I take my medications without the approval of the health care provider who signs this Medication Agreement. · I will arrange for refills at the prescribed interval ONLY during regular office hours. I will not ask for refills earlier than agreed, after-hours, on holidays or on weekends. · I will obtain all refills for these medications at  ·  ________________[facility]  [facility]acy (phone number  ·  ________________________), with full consent for my provider and pharmacist to exchange information in writing or verbally. · I will not request any pain medications or controlled substances from other providers and will inform this provider of all other medications I am taking. · I will inform my other health care providers that I am taking these medications and of the existence of this [id number]. In the event of an emergency, I will provide the same information to the emergency department providers. · I will protect my prescriptions and medications. I understand that lost or misplaced prescriptions will not be replaced. · I will keep medications only for my own use and will not share them with others. I will keep all medications away from children. · I agree to participate in any medical, psychological or psychiatric assessments recommended by my provider. · I will actively participate in any program designed to improve function, including social, physical, psychological and daily or work activities. 2. I will not use illegal or street drugs or another person's prescription. If I have an addiction problem with drugs or alcohol and my provider asks me to enter a program to address this issue, I agree to follow through. Such programs may include:  · 12-Step program and securing a sponsor  · Individual counseling   · Inpatient or outpatient treatment  · Other:_____________________________________________________________________________________________________________________________________________    If in treatment, I will request that a copy of the programs initial evaluation and treatment recommendations be sent to this provider and will not expect refills until that is received. I will also request written monthly updates be sent to this provider to verify my continuing treatment. 3. I will consent to drug screening upon my providers request to assure I am only taking the prescribed drugs, described in this MEDICATION AGREEMENT. I understand that a drug screen is a laboratory test in which a sample of my urine, blood or saliva is checked to see what drugs I have been taking. 4. I agree that I will treat the providers and staff at this office with respect at all times. I will keep all of my scheduled appointments, but if I need to cancel my appointment, I will do so a minimum of 24 hours before it is scheduled. 5. I understand that this provider may stop prescribing the medications listed if:  · I do not show any improvement in pain, or my activity has not improved. · I develop rapid tolerance or loss of improvement, as described in my treatment plan. · I develop significant side effects from the medication.   · My behavior is inconsistent with the responsibilities outlined above,

## 2018-02-20 NOTE — PROGRESS NOTES
Subjective  Cece Guzman, 76 y.o. female presents today with:  Chief Complaint   Patient presents with    URI                HPI  Patient presents today complaining of a cough, chest tightness, wheezing, and congestion x 2 weeks. On 2-10-18 was prescribed Tessalon, and Ceftin. States these did not help. Is very upset she is still sick. Her daughter has Stage 4 Lung Cancer, and starts Chemo today. Patient cannot see her because she is so sick. Coughs so hard she urinates. Has also had uncontrollable diarrhea. Had an accident yesterday. Took a double dose of Imodium last night. Taking current medications which were reviewed. Problem list discussed. Eating okay. Overall doing well. Has no other new problem / question. Health Maintenance Is Up-To-Date         No other questions and or concerns for today's visit      Review of Systems   Constitutional: Negative for activity change, appetite change, fatigue and fever. HENT: Positive for congestion. Negative for ear pain. Eyes: Negative. Respiratory: Positive for cough and wheezing. Negative for shortness of breath. Cardiovascular: Negative for chest pain and palpitations. Gastrointestinal: Positive for diarrhea. Negative for abdominal pain, constipation and nausea. Genitourinary: Negative for dysuria and frequency. Neurological: Negative for dizziness and headaches.          Past Medical History:   Diagnosis Date    Depression     GERD (gastroesophageal reflux disease)     Hyperlipidemia     Hypertension     Obesity     Type II or unspecified type diabetes mellitus without mention of complication, not stated as uncontrolled      Past Surgical History:   Procedure Laterality Date    CARDIAC CATHETERIZATION      SPINE SURGERY       Social History     Social History    Marital status:      Spouse name: N/A    Number of children: 3    Years of education: N/A     Occupational History     Retired     Social History Main Topics    Smoking status: Former Smoker     Types: Cigarettes     Start date: 1/1/1974     Quit date: 3/11/2003    Smokeless tobacco: Never Used    Alcohol use No    Drug use: No    Sexual activity: No     Other Topics Concern    Not on file     Social History Narrative    No narrative on file     Family History   Problem Relation Age of Onset    Cancer Mother      Allergies   Allergen Reactions    Other      SPORONAX     Current Outpatient Prescriptions   Medication Sig Dispense Refill    levofloxacin (LEVAQUIN) 500 MG tablet Take 1 tablet by mouth daily for 10 days 10 tablet 0    predniSONE (DELTASONE) 10 MG tablet Tid for 3 days then 1 daily for 2 days 11 tablet 0    guaiFENesin-codeine (CHERATUSSIN AC) 100-10 MG/5ML syrup Take 10 mLs by mouth every 4 hours as needed for Cough. 180 mL 0    benazepril (LOTENSIN) 20 MG tablet TAKE 1 TABLET BY MOUTH EVERY DAY 90 tablet 1    atorvastatin (LIPITOR) 10 MG tablet TAKE 1 TABLET BY MOUTH DAILY 90 tablet 1    LORazepam (ATIVAN) 0.5 MG tablet TAKE 1 TABLET EVERY 6 HOURS AS NEEDED. 30 tablet 3    glucose blood VI test strips (ONETOUCH VERIO) strip TEST  each 3    primidone (MYSOLINE) 50 MG tablet BID FOR TREMOR 60 tablet 3    triamcinolone (KENALOG) 0.1 % cream Apply topically 2 times daily. 45 g 1    glimepiride (AMARYL) 4 MG tablet TAKE 1 TABLET BY MOUTH EVERY MORNING 90 tablet 1    citalopram (CELEXA) 20 MG tablet TAKE 1 TABLET BY MOUTH EVERY DAY 90 tablet 1    omeprazole (PRILOSEC) 20 MG delayed release capsule TAKE 2 CAPSULES BY MOUTH EVERY  capsule 1    pioglitazone (ACTOS) 45 MG tablet TAKE 1 TABLET BY MOUTH EVERY DAY 90 tablet 0    ibuprofen (ADVIL;MOTRIN) 400 MG tablet Take 400 mg by mouth every 4 hours as needed for Pain       No current facility-administered medications for this visit. PMH, Surgical Hx, Family Hx, and Social Hx reviewed and updated. Health Maintenance reviewed.     Objective    Vitals:    02/20/18 180 mL     Refill:  0     Medications Discontinued During This Encounter   Medication Reason    benzonatate (TESSALON PERLES) 100 MG capsule Therapy completed    cefUROXime (CEFTIN) 250 MG tablet Therapy completed    diazepam (VALIUM) 5 MG tablet Alternate therapy     No Follow-up on file. Long talk. Over-the-counter cold medications when necessary  Take medications as prescribed. Over-the-counter probiotic daily  Call or return to office as needed if these symptoms worsen or fail to improve as anticipated. Follow up as instructed. Call if any problems      Controlled Substances Monitoring:         Harjeet Lopez MD          Please note this report has been partially produced using speech recognition software  And may cause contain errors related to that system including grammar, punctuation and spelling as well as words and phrases that may seem inappropriate. If there are questions or concerns please feel free to contact me to clarify.

## 2018-02-23 ENCOUNTER — TELEPHONE (OUTPATIENT)
Dept: FAMILY MEDICINE CLINIC | Age: 68
End: 2018-02-23

## 2018-02-23 RX ORDER — FLUCONAZOLE 150 MG/1
150 TABLET ORAL ONCE
Qty: 1 TABLET | Refills: 0 | Status: SHIPPED | OUTPATIENT
Start: 2018-02-23 | End: 2018-02-23

## 2018-02-23 RX ORDER — OMEPRAZOLE 20 MG/1
CAPSULE, DELAYED RELEASE ORAL
Qty: 180 CAPSULE | Refills: 1 | Status: SHIPPED | OUTPATIENT
Start: 2018-02-23 | End: 2018-06-21 | Stop reason: ALTCHOICE

## 2018-03-09 RX ORDER — CITALOPRAM 20 MG/1
TABLET ORAL
Qty: 90 TABLET | Refills: 0 | Status: SHIPPED | OUTPATIENT
Start: 2018-03-09 | End: 2018-07-08 | Stop reason: SDUPTHER

## 2018-05-03 ENCOUNTER — TELEPHONE (OUTPATIENT)
Dept: FAMILY MEDICINE CLINIC | Age: 68
End: 2018-05-03

## 2018-05-21 ENCOUNTER — TELEPHONE (OUTPATIENT)
Dept: FAMILY MEDICINE CLINIC | Age: 68
End: 2018-05-21

## 2018-06-21 ENCOUNTER — TELEPHONE (OUTPATIENT)
Dept: FAMILY MEDICINE CLINIC | Age: 68
End: 2018-06-21

## 2018-06-21 ENCOUNTER — OFFICE VISIT (OUTPATIENT)
Dept: FAMILY MEDICINE CLINIC | Age: 68
End: 2018-06-21
Payer: MEDICARE

## 2018-06-21 VITALS
HEIGHT: 66 IN | DIASTOLIC BLOOD PRESSURE: 74 MMHG | RESPIRATION RATE: 18 BRPM | HEART RATE: 75 BPM | WEIGHT: 293 LBS | BODY MASS INDEX: 47.09 KG/M2 | SYSTOLIC BLOOD PRESSURE: 128 MMHG | TEMPERATURE: 97.4 F

## 2018-06-21 DIAGNOSIS — B00.1 COLD SORE: Primary | ICD-10-CM

## 2018-06-21 DIAGNOSIS — E11.9 CONTROLLED TYPE 2 DIABETES MELLITUS WITHOUT COMPLICATION, WITHOUT LONG-TERM CURRENT USE OF INSULIN (HCC): ICD-10-CM

## 2018-06-21 DIAGNOSIS — Z23 NEED FOR PNEUMOCOCCAL VACCINATION: ICD-10-CM

## 2018-06-21 PROCEDURE — 4040F PNEUMOC VAC/ADMIN/RCVD: CPT | Performed by: FAMILY MEDICINE

## 2018-06-21 PROCEDURE — G8427 DOCREV CUR MEDS BY ELIG CLIN: HCPCS | Performed by: FAMILY MEDICINE

## 2018-06-21 PROCEDURE — 99213 OFFICE O/P EST LOW 20 MIN: CPT | Performed by: FAMILY MEDICINE

## 2018-06-21 PROCEDURE — 3017F COLORECTAL CA SCREEN DOC REV: CPT | Performed by: FAMILY MEDICINE

## 2018-06-21 PROCEDURE — 3045F PR MOST RECENT HEMOGLOBIN A1C LEVEL 7.0-9.0%: CPT | Performed by: FAMILY MEDICINE

## 2018-06-21 PROCEDURE — 1101F PT FALLS ASSESS-DOCD LE1/YR: CPT | Performed by: FAMILY MEDICINE

## 2018-06-21 PROCEDURE — 1090F PRES/ABSN URINE INCON ASSESS: CPT | Performed by: FAMILY MEDICINE

## 2018-06-21 PROCEDURE — G8400 PT W/DXA NO RESULTS DOC: HCPCS | Performed by: FAMILY MEDICINE

## 2018-06-21 PROCEDURE — 90670 PCV13 VACCINE IM: CPT | Performed by: FAMILY MEDICINE

## 2018-06-21 PROCEDURE — G8417 CALC BMI ABV UP PARAM F/U: HCPCS | Performed by: FAMILY MEDICINE

## 2018-06-21 PROCEDURE — 1123F ACP DISCUSS/DSCN MKR DOCD: CPT | Performed by: FAMILY MEDICINE

## 2018-06-21 PROCEDURE — G0009 ADMIN PNEUMOCOCCAL VACCINE: HCPCS | Performed by: FAMILY MEDICINE

## 2018-06-21 PROCEDURE — 1036F TOBACCO NON-USER: CPT | Performed by: FAMILY MEDICINE

## 2018-06-21 PROCEDURE — 2022F DILAT RTA XM EVC RTNOPTHY: CPT | Performed by: FAMILY MEDICINE

## 2018-06-21 RX ORDER — VALACYCLOVIR HYDROCHLORIDE 500 MG/1
500 TABLET, FILM COATED ORAL 2 TIMES DAILY
Qty: 20 TABLET | Refills: 1 | Status: SHIPPED | OUTPATIENT
Start: 2018-06-21

## 2018-06-21 RX ORDER — CEPHALEXIN 500 MG/1
500 CAPSULE ORAL 3 TIMES DAILY
Qty: 30 CAPSULE | Refills: 0 | Status: SHIPPED | OUTPATIENT
Start: 2018-06-21 | End: 2018-07-01

## 2018-06-21 RX ORDER — ESOMEPRAZOLE MAGNESIUM 20 MG/1
20 FOR SUSPENSION ORAL DAILY
COMMUNITY

## 2018-06-21 ASSESSMENT — ENCOUNTER SYMPTOMS
EYE ITCHING: 0
CHEST TIGHTNESS: 0
CONSTIPATION: 0
EYE DISCHARGE: 0
SINUS PRESSURE: 0
SORE THROAT: 0
ABDOMINAL PAIN: 0
SHORTNESS OF BREATH: 0
TROUBLE SWALLOWING: 0
DIARRHEA: 0

## 2018-06-21 NOTE — PROGRESS NOTES
Eyes: Conjunctivae are normal. Pupils are equal, round, and reactive to light. Neck: Neck supple. Cardiovascular: Normal rate, regular rhythm and normal heart sounds. No murmur heard. Pulmonary/Chest: Breath sounds normal. She has no wheezes. Abdominal: Soft. She exhibits no mass. There is no tenderness. Lymphadenopathy:     She has no cervical adenopathy. Assessment & Plan    Diagnosis Orders   1. Cold sore  valACYclovir (VALTREX) 500 MG tablet    cephALEXin (KEFLEX) 500 MG capsule   2. Need for pneumococcal vaccination  PREVNAR 13 IM (Pneumococcal conjugate vaccine 13-valent)   3. Controlled type 2 diabetes mellitus without complication, without long-term current use of insulin ([facility] [address]       Orders Placed This Encounter   Procedures    PREVNAR 13 IM (Pneumococcal conjugate vaccine 13-valent)     Orders Placed This Encounter   Medications    valACYclovir (VALTREX) 500 MG tablet     Sig: Take 1 tablet by mouth 2 times daily     Dispense:  20 tablet     Refill:  1    cephALEXin (KEFLEX) 500 MG capsule     Sig: Take 1 capsule by mouth 3 times daily for 10 days     Dispense:  30 capsule     Refill:  0     Medications Discontinued During This Encounter   Medication Reason    guaiFENesin-codeine (CHERATUSSIN AC) 100-10 MG/5ML syrup Therapy completed    ibuprofen (ADVIL;MOTRIN) 400 MG tablet Therapy completed    omeprazole (PRILOSEC) 20 MG delayed release capsule Therapy completed    primidone (MYSOLINE) 50 MG tablet Therapy completed     No Follow-up on file. Long talk. The importance of a good diet and exercise regimen discussed. Take medications as prescribed. Follow up as instructed. Call if any problems             Wilene Harada, MD          Please note this report has been partially produced using speech recognition software  And may cause contain errors related to that system including grammar, punctuation and spelling as well as words and phrases that may seem inappropriate.  If

## 2018-07-08 DIAGNOSIS — F32.0 MILD SINGLE CURRENT EPISODE OF MAJOR DEPRESSIVE DISORDER (HCC): Primary | ICD-10-CM

## 2018-07-09 RX ORDER — CITALOPRAM 20 MG/1
TABLET ORAL
Qty: 90 TABLET | Refills: 1 | Status: SHIPPED | OUTPATIENT
Start: 2018-07-09 | End: 2019-03-04 | Stop reason: SDUPTHER

## 2018-08-03 RX ORDER — PIOGLITAZONEHYDROCHLORIDE 45 MG/1
TABLET ORAL
Qty: 90 TABLET | Refills: 2 | Status: SHIPPED | OUTPATIENT
Start: 2018-08-03

## 2018-08-15 DIAGNOSIS — F41.9 ANXIETY: Primary | ICD-10-CM

## 2018-08-16 RX ORDER — LORAZEPAM 0.5 MG/1
TABLET ORAL
Qty: 30 TABLET | Refills: 0 | OUTPATIENT
Start: 2018-08-16 | End: 2018-09-15

## 2018-09-10 RX ORDER — OMEPRAZOLE 20 MG/1
CAPSULE, DELAYED RELEASE ORAL
Qty: 180 CAPSULE | Refills: 1 | Status: SHIPPED | OUTPATIENT
Start: 2018-09-10 | End: 2019-04-03 | Stop reason: SDUPTHER

## 2018-09-17 RX ORDER — GLIMEPIRIDE 4 MG/1
TABLET ORAL
Qty: 90 TABLET | Refills: 1 | Status: SHIPPED | OUTPATIENT
Start: 2018-09-17

## 2018-09-27 DIAGNOSIS — E78.2 MIXED HYPERLIPIDEMIA: Primary | ICD-10-CM

## 2018-09-28 RX ORDER — ATORVASTATIN CALCIUM 10 MG/1
10 TABLET, FILM COATED ORAL DAILY
Qty: 90 TABLET | Refills: 0 | Status: SHIPPED | OUTPATIENT
Start: 2018-09-28

## 2018-11-26 ENCOUNTER — TELEPHONE (OUTPATIENT)
Dept: FAMILY MEDICINE CLINIC | Age: 68
End: 2018-11-26

## 2018-11-26 DIAGNOSIS — R05.9 COUGH: Primary | ICD-10-CM

## 2018-11-26 RX ORDER — GUAIFENESIN AND CODEINE PHOSPHATE 100; 10 MG/5ML; MG/5ML
10 SOLUTION ORAL EVERY 4 HOURS PRN
Qty: 240 ML | Refills: 0 | Status: SHIPPED | OUTPATIENT
Start: 2018-11-26 | End: 2019-03-04 | Stop reason: ALTCHOICE

## 2018-11-26 RX ORDER — AZITHROMYCIN 250 MG/1
TABLET, FILM COATED ORAL
Qty: 6 TABLET | Refills: 0 | Status: SHIPPED | OUTPATIENT
Start: 2018-11-26 | End: 2019-03-04 | Stop reason: ALTCHOICE

## 2019-01-08 ENCOUNTER — TELEPHONE (OUTPATIENT)
Dept: FAMILY MEDICINE CLINIC | Age: 69
End: 2019-01-08

## 2019-01-08 RX ORDER — BENZONATATE 100 MG/1
100 CAPSULE ORAL 3 TIMES DAILY PRN
Qty: 30 CAPSULE | Refills: 0 | Status: SHIPPED | OUTPATIENT
Start: 2019-01-08 | End: 2019-01-15

## 2019-01-08 RX ORDER — CEFUROXIME AXETIL 250 MG/1
250 TABLET ORAL 2 TIMES DAILY
Qty: 20 TABLET | Refills: 0 | Status: SHIPPED | OUTPATIENT
Start: 2019-01-08 | End: 2019-01-18

## 2019-02-24 LAB
GLUCOSE BLD-MCNC: 125 MG/DL (ref 70–100)
GLUCOSE BLD-MCNC: 130 MG/DL (ref 70–100)

## 2019-02-27 ENCOUNTER — TELEPHONE (OUTPATIENT)
Dept: FAMILY MEDICINE CLINIC | Age: 69
End: 2019-02-27

## 2019-03-04 ENCOUNTER — OFFICE VISIT (OUTPATIENT)
Dept: FAMILY MEDICINE CLINIC | Age: 69
End: 2019-03-04
Payer: MEDICARE

## 2019-03-04 VITALS
TEMPERATURE: 97 F | SYSTOLIC BLOOD PRESSURE: 130 MMHG | DIASTOLIC BLOOD PRESSURE: 80 MMHG | RESPIRATION RATE: 12 BRPM | WEIGHT: 293 LBS | HEIGHT: 66 IN | BODY MASS INDEX: 47.09 KG/M2 | HEART RATE: 72 BPM

## 2019-03-04 DIAGNOSIS — R29.898 LEFT LEG WEAKNESS: ICD-10-CM

## 2019-03-04 DIAGNOSIS — E78.2 MIXED HYPERLIPIDEMIA: ICD-10-CM

## 2019-03-04 DIAGNOSIS — E11.9 CONTROLLED TYPE 2 DIABETES MELLITUS WITHOUT COMPLICATION, WITHOUT LONG-TERM CURRENT USE OF INSULIN (HCC): ICD-10-CM

## 2019-03-04 DIAGNOSIS — F32.0 MILD SINGLE CURRENT EPISODE OF MAJOR DEPRESSIVE DISORDER (HCC): ICD-10-CM

## 2019-03-04 DIAGNOSIS — I10 ESSENTIAL HYPERTENSION: ICD-10-CM
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
## 2019-03-04 DIAGNOSIS — Z91.81 AT HIGH RISK FOR FALLS: ICD-10-CM

## 2019-03-04 PROCEDURE — 1036F TOBACCO NON-USER: CPT | Performed by: FAMILY MEDICINE

## 2019-03-04 PROCEDURE — G8417 CALC BMI ABV UP PARAM F/U: HCPCS | Performed by: FAMILY MEDICINE

## 2019-03-04 PROCEDURE — 1090F PRES/ABSN URINE INCON ASSESS: CPT | Performed by: FAMILY MEDICINE

## 2019-03-04 PROCEDURE — 1123F ACP DISCUSS/DSCN MKR DOCD: CPT | Performed by: FAMILY MEDICINE

## 2019-03-04 PROCEDURE — 4040F PNEUMOC VAC/ADMIN/RCVD: CPT | Performed by: FAMILY MEDICINE

## 2019-03-04 PROCEDURE — 99213 OFFICE O/P EST LOW 20 MIN: CPT | Performed by: FAMILY MEDICINE

## 2019-03-04 PROCEDURE — G8427 DOCREV CUR MEDS BY ELIG CLIN: HCPCS | Performed by: FAMILY MEDICINE

## 2019-03-04 PROCEDURE — G8400 PT W/DXA NO RESULTS DOC: HCPCS | Performed by: FAMILY MEDICINE

## 2019-03-04 PROCEDURE — 3017F COLORECTAL CA SCREEN DOC REV: CPT | Performed by: FAMILY MEDICINE

## 2019-03-04 PROCEDURE — G8484 FLU IMMUNIZE NO ADMIN: HCPCS | Performed by: FAMILY MEDICINE

## 2019-03-04 PROCEDURE — 2022F DILAT RTA XM EVC RTNOPTHY: CPT | Performed by: FAMILY MEDICINE

## 2019-03-04 PROCEDURE — 3044F HG A1C LEVEL LT 7.0%: CPT | Performed by: FAMILY MEDICINE

## 2019-03-04 RX ORDER — BENAZEPRIL HYDROCHLORIDE 20 MG/1
TABLET ORAL
Qty: 90 TABLET | Refills: 3 | Status: SHIPPED | OUTPATIENT
Start: 2019-03-04

## 2019-03-04 RX ORDER — CITALOPRAM 20 MG/1
TABLET ORAL
Qty: 90 TABLET | Refills: 3 | Status: SHIPPED | OUTPATIENT
Start: 2019-03-04

## 2019-03-04 ASSESSMENT — ENCOUNTER SYMPTOMS
NAUSEA: 0
COUGH: 0
SHORTNESS OF BREATH: 0
EYES NEGATIVE: 1
DIARRHEA: 0
CONSTIPATION: 0
ABDOMINAL PAIN: 0

## 2019-03-15 ENCOUNTER — TELEPHONE (OUTPATIENT)
Dept: ADMINISTRATIVE | Age: 69
End: 2019-03-15

## 2019-04-08 RX ORDER — OMEPRAZOLE 20 MG/1
CAPSULE, DELAYED RELEASE ORAL
Qty: 180 CAPSULE | Refills: 0 | Status: SHIPPED | OUTPATIENT
Start: 2019-04-08

## 2019-04-08 NOTE — TELEPHONE ENCOUNTER
Patient was last seen on 3-4-19  Last Prescribed 9-10-18    Medication is pending  Please approve or deny this request

## 2019-10-04 RX ORDER — GLIMEPIRIDE 4 MG/1
TABLET ORAL
Qty: 90 TABLET | Refills: 1 | Status: CANCELLED | OUTPATIENT
Start: 2019-10-04

## 2021-10-06 ENCOUNTER — HOSPITAL ENCOUNTER (OUTPATIENT)
Dept: WOMENS IMAGING | Age: 71
Discharge: HOME | End: 2021-10-08
Payer: MEDICARE

## 2021-10-06 VITALS — BODY MASS INDEX: 52 KG/M2 | HEIGHT: 66 IN

## 2021-10-06 DIAGNOSIS — Z12.31 ENCOUNTER FOR SCREENING MAMMOGRAM FOR BREAST CANCER: ICD-10-CM

## 2021-10-06 PROCEDURE — 77063 BREAST TOMOSYNTHESIS BI: CPT

## 2023-03-19 DIAGNOSIS — G20.A1 PARKINSON DISEASE (MULTI): Primary | ICD-10-CM

## 2023-03-20 NOTE — TELEPHONE ENCOUNTER
Please call and assist patient in scheduling an appointment for further refills of Celexa and Carbidopa-Levodopa. If patient needs a short term supply, please let us know.

## 2023-03-24 RX ORDER — CARBIDOPA AND LEVODOPA 25; 100 MG/1; MG/1
TABLET ORAL
Qty: 180 TABLET | Refills: 0 | Status: SHIPPED | OUTPATIENT
Start: 2023-03-24

## 2023-03-24 RX ORDER — CITALOPRAM 20 MG/1
TABLET, FILM COATED ORAL
Qty: 90 TABLET | Refills: 0 | Status: SHIPPED | OUTPATIENT
Start: 2023-03-24 | End: 2023-12-21 | Stop reason: SDUPTHER

## 2023-03-24 NOTE — TELEPHONE ENCOUNTER
Spoke with patient - she currently has [place] [employer] - we do not take this insurance - she is going to try to change insurances, will call us back once she takes care of that situation - ok on medications currently.    Please cancel orders    Thank you!

## 2023-04-07 DIAGNOSIS — N32.81 OAB (OVERACTIVE BLADDER): ICD-10-CM

## 2023-04-07 RX ORDER — SOLIFENACIN SUCCINATE 5 MG/1
1 TABLET, FILM COATED ORAL DAILY
COMMUNITY
Start: 2021-01-29 | End: 2023-04-07 | Stop reason: SDUPTHER

## 2023-04-07 RX ORDER — SOLIFENACIN SUCCINATE 5 MG/1
5 TABLET, FILM COATED ORAL DAILY
Qty: 30 TABLET | Refills: 0 | Status: SHIPPED | OUTPATIENT
Start: 2023-04-07 | End: 2024-01-23 | Stop reason: SDUPTHER

## 2023-04-07 NOTE — TELEPHONE ENCOUNTER
Rx Refill Request Telephone Encounter    Name:  Sofya Dunaway  : 1950     Medication Name:  SOLIFENACIN SUCCINATE   Dose (Optional):    5MG  Quantity (Optional):    SHORT SUPPLY  Directions (Optional):   TAKE 1 TABLET DAILY    ALLERGIES:   SPORANOX    [facility]    Date of last appointment:  9/15/2022  Date of next appointment:  2023    Best number to reach patient:  [phone]

## 2023-04-19 ENCOUNTER — APPOINTMENT (OUTPATIENT)
Dept: PRIMARY CARE | Facility: CLINIC | Age: 73
End: 2023-04-19
Payer: MEDICAID

## 2023-05-02 ENCOUNTER — APPOINTMENT (OUTPATIENT)
Dept: PRIMARY CARE | Facility: CLINIC | Age: 73
End: 2023-05-02
Payer: MEDICAID

## 2023-06-16 ENCOUNTER — TELEPHONE (OUTPATIENT)
Dept: PRIMARY CARE | Facility: CLINIC | Age: 73
End: 2023-06-16
Payer: MEDICARE

## 2023-06-16 DIAGNOSIS — M79.89 LEG SWELLING: Primary | ICD-10-CM

## 2023-06-16 RX ORDER — TRIAMTERENE/HYDROCHLOROTHIAZID 37.5-25 MG
TABLET ORAL
Qty: 30 TABLET | Refills: 2 | Status: SHIPPED | OUTPATIENT
Start: 2023-06-16 | End: 2023-12-21 | Stop reason: SDUPTHER

## 2023-06-16 NOTE — TELEPHONE ENCOUNTER
Triamterene not on current list, available to reconcile or in the old system. Please advise if okay  to fill

## 2023-06-16 NOTE — TELEPHONE ENCOUNTER
Rx Refill Request Telephone Encounter    Name:  Sofya Dunaway  : 1950     Medication Name:  Triamterene 37.5 MG  Quantity (Optional):    90  Directions (Optional):   Take .5 three times per week    Specific Pharmacy location:  [facility]    Date of last appointment:    Date of next appointment:  23    Patient aware Dr. Tobias is out of the office until Monday. Ok waiting on refill

## 2023-06-30 ENCOUNTER — APPOINTMENT (OUTPATIENT)
Dept: PRIMARY CARE | Facility: CLINIC | Age: 73
End: 2023-06-30
Payer: MEDICARE

## 2023-07-25 ENCOUNTER — APPOINTMENT (OUTPATIENT)
Dept: PRIMARY CARE | Facility: CLINIC | Age: 73
End: 2023-07-25
Payer: MEDICARE

## 2023-08-08 PROBLEM — K21.9 GASTROESOPHAGEAL REFLUX DISEASE WITHOUT ESOPHAGITIS: Status: ACTIVE | Noted: 2023-08-08

## 2023-08-08 PROBLEM — R32 BLADDER INCONTINENCE: Status: ACTIVE | Noted: 2023-08-08

## 2023-08-08 PROBLEM — F32.A MILD DEPRESSION: Status: ACTIVE | Noted: 2023-08-08

## 2023-08-08 PROBLEM — E78.5 HYPERLIPIDEMIA: Status: ACTIVE | Noted: 2023-08-08

## 2023-08-08 PROBLEM — G25.0 TREMOR, ESSENTIAL: Status: ACTIVE | Noted: 2023-08-08

## 2023-08-08 PROBLEM — R60.0 LOCALIZED EDEMA: Status: ACTIVE | Noted: 2023-08-08

## 2023-08-08 PROBLEM — M15.0 PRIMARY OSTEOARTHRITIS INVOLVING MULTIPLE JOINTS: Status: ACTIVE | Noted: 2023-08-08

## 2023-08-08 PROBLEM — J02.9 SORE THROAT: Status: ACTIVE | Noted: 2023-08-08

## 2023-08-08 PROBLEM — F41.9 ANXIETY: Status: ACTIVE | Noted: 2023-08-08

## 2023-08-08 PROBLEM — R51.9 FREQUENT HEADACHES: Status: ACTIVE | Noted: 2023-08-08

## 2023-08-08 PROBLEM — L40.9 PSORIASIS: Status: ACTIVE | Noted: 2023-08-08

## 2023-08-08 PROBLEM — R53.83 FATIGUE: Status: ACTIVE | Noted: 2023-08-08

## 2023-08-08 PROBLEM — M62.838 MUSCLE SPASM: Status: ACTIVE | Noted: 2023-08-08

## 2023-08-08 PROBLEM — I10 HTN (HYPERTENSION): Status: ACTIVE | Noted: 2023-08-08

## 2023-08-08 PROBLEM — R42 DIZZINESS: Status: ACTIVE | Noted: 2023-08-08

## 2023-08-08 PROBLEM — M15.9 PRIMARY OSTEOARTHRITIS INVOLVING MULTIPLE JOINTS: Status: ACTIVE | Noted: 2023-08-08

## 2023-08-08 PROBLEM — L30.9 ECZEMA: Status: ACTIVE | Noted: 2023-08-08

## 2023-08-08 PROBLEM — E11.9 TYPE 2 DIABETES MELLITUS WITHOUT COMPLICATION, WITHOUT LONG-TERM CURRENT USE OF INSULIN (MULTI): Status: ACTIVE | Noted: 2023-08-08

## 2023-08-08 PROBLEM — J01.90 ACUTE SINUSITIS: Status: ACTIVE | Noted: 2023-08-08

## 2023-08-08 RX ORDER — PIOGLITAZONEHYDROCHLORIDE 45 MG/1
45 TABLET ORAL DAILY
COMMUNITY
End: 2024-01-23 | Stop reason: SDUPTHER

## 2023-08-08 RX ORDER — KETOCONAZOLE 20 MG/G
CREAM TOPICAL
COMMUNITY
Start: 2022-05-18

## 2023-08-08 RX ORDER — HYDROCORTISONE 25 MG/G
CREAM TOPICAL
COMMUNITY
Start: 2022-01-05

## 2023-08-08 RX ORDER — BLOOD SUGAR DIAGNOSTIC
STRIP MISCELLANEOUS
COMMUNITY
Start: 2019-05-03

## 2023-08-08 RX ORDER — BETAMETHASONE DIPROPIONATE 0.5 MG/G
LOTION TOPICAL
COMMUNITY
Start: 2019-08-19

## 2023-08-08 RX ORDER — GLIMEPIRIDE 1 MG/1
TABLET ORAL
COMMUNITY
Start: 2019-12-13

## 2023-08-08 RX ORDER — MULTIVITAMIN
TABLET ORAL
COMMUNITY

## 2023-08-08 RX ORDER — TRIAMCINOLONE ACETONIDE 1 MG/G
CREAM TOPICAL 2 TIMES DAILY
COMMUNITY
Start: 2021-10-05

## 2023-08-08 RX ORDER — ATORVASTATIN CALCIUM 10 MG/1
5 TABLET, FILM COATED ORAL DAILY
COMMUNITY
End: 2023-08-24 | Stop reason: SDUPTHER

## 2023-08-08 RX ORDER — CHOLECALCIFEROL (VITAMIN D3) 125 MCG
CAPSULE ORAL
COMMUNITY

## 2023-08-08 RX ORDER — KETOCONAZOLE 20 MG/ML
SHAMPOO, SUSPENSION TOPICAL
COMMUNITY
Start: 2022-05-18

## 2023-08-08 RX ORDER — OMEPRAZOLE 20 MG/1
40 CAPSULE, DELAYED RELEASE ORAL DAILY
COMMUNITY
End: 2023-08-24 | Stop reason: SDUPTHER

## 2023-08-08 RX ORDER — CLOBETASOL PROPIONATE 0.46 MG/ML
SOLUTION TOPICAL
COMMUNITY
Start: 2022-05-18

## 2023-08-08 NOTE — PROGRESS NOTES
Subjective   Patient ID: Sofya Dunaway is a 73 y.o. female who presents for No chief complaint on file..    HPI    Patient presents today for AWV, HTN, HLD, DM follow up. She does try to stick to low sodium, low cholesterol diet. Does/not exercise. Denies SOB or chest pain.      Taking current medications which were reviewed.  Problem list discussed.    Overall doing well.  Eating okay.  Staying active.    Has no other new problem /question.      ROS  Constitutional- No activity change. No appetite change.  Eyes- Denies vision changes.  Respiratory- No shortness of breath.  Cardiovascular- No palpitations. No chest pain.  GI- No nausea or vomiting. No diarrhea or constipation. Denies abdominal pain.  Musculoskeletal- Denies joint swelling.  Extremities- No edema.  Neurological- Denies headaches. Denies dizziness.  Skin- No rashes.  Psychiatric/Behavioral- Denies significant anxiety, or depressed mood.     Objective     There were no vitals taken for this visit.    Not on File    Constitutional-- Well-nourished.  No distress  Head- unremarkable.  Ears- TMs clear.  Eyes- PERRL.  Conjunctiva normal.  Nose- Normal.  No rhinorrhea noted.  Throat- Oropharynx is clear and moist.  Neck- Supple with no thyromegaly.  No significant cervical adenopathy noted.  Pulmonary/Chest- Breath sounds normal with normal effort.  No wheezing.  Heart- Regular rate and rhythm.  No murmur.  Abdomen- Soft and non-tender.  No masses noted.  Musculoskeletal- Normal ROM.  No significant joint swelling  Extremities- No edema.   Neurological- Alert.  No noted deficits.  Skin- Warm.  No rashes.  Psychiatric/Behavioral- Mood and affect normal.  Behavior normal.     Assessment/Plan   1. Primary hypertension        2. Mixed hyperlipidemia        3. Type 2 diabetes mellitus without complication, without long-term current use of insulin ([facility])               Long talk. Treatment options reviewed.    Continue and take your medications as  prescribed.    Health Maintenance issues discussed.    Importance of healthy diet and regular exercise regimen discussed.    We will contact you with any test results ordered. If you do not hear from us, please contact.    Follow-up as instructed or sooner if any problems or symptoms do not resolve as expected.

## 2023-08-09 ENCOUNTER — APPOINTMENT (OUTPATIENT)
Dept: PRIMARY CARE | Facility: CLINIC | Age: 73
End: 2023-08-09
Payer: MEDICARE

## 2023-08-23 ENCOUNTER — APPOINTMENT (OUTPATIENT)
Dept: PRIMARY CARE | Facility: CLINIC | Age: 73
End: 2023-08-23
Payer: MEDICARE

## 2023-08-24 DIAGNOSIS — K21.9 GASTROESOPHAGEAL REFLUX DISEASE WITHOUT ESOPHAGITIS: ICD-10-CM

## 2023-08-24 DIAGNOSIS — E78.5 HYPERLIPIDEMIA, UNSPECIFIED HYPERLIPIDEMIA TYPE: ICD-10-CM

## 2023-08-24 RX ORDER — ATORVASTATIN CALCIUM 10 MG/1
5 TABLET, FILM COATED ORAL DAILY
Qty: 15 TABLET | Refills: 0 | Status: SHIPPED | OUTPATIENT
Start: 2023-08-24 | End: 2023-09-20

## 2023-08-24 RX ORDER — OMEPRAZOLE 20 MG/1
40 CAPSULE, DELAYED RELEASE ORAL DAILY
Qty: 30 CAPSULE | Refills: 0 | Status: SHIPPED | OUTPATIENT
Start: 2023-08-24 | End: 2023-09-05

## 2023-08-24 NOTE — TELEPHONE ENCOUNTER
PATIENT IS CALLING A SHORT SUPPLY ON:    OMEPRAZOLE 20 MG - 2 CAPSULES BY MOUTH DAILY    ATORVASTATIN 10 MG - 1/2 TABLET DAILY    [facility] IN [place]    LAST OV: 8/24/23    NEXT OV: 9/15/23

## 2023-08-25 ENCOUNTER — OFFICE VISIT (OUTPATIENT)
Dept: PRIMARY CARE | Facility: CLINIC | Age: 73
End: 2023-08-25
Payer: MEDICARE

## 2023-08-25 VITALS
BODY MASS INDEX: 47.09 KG/M2 | TEMPERATURE: 97.5 F | RESPIRATION RATE: 16 BRPM | OXYGEN SATURATION: 96 % | HEART RATE: 69 BPM | WEIGHT: 293 LBS | SYSTOLIC BLOOD PRESSURE: 126 MMHG | DIASTOLIC BLOOD PRESSURE: 82 MMHG | HEIGHT: 66 IN

## 2023-08-25 DIAGNOSIS — M75.51 ACUTE SHOULDER BURSITIS, RIGHT: Primary | ICD-10-CM

## 2023-08-25 DIAGNOSIS — I10 PRIMARY HYPERTENSION: ICD-10-CM

## 2023-08-25 DIAGNOSIS — M79.601 RIGHT ARM PAIN: ICD-10-CM

## 2023-08-25 PROCEDURE — 1159F MED LIST DOCD IN RCRD: CPT | Performed by: FAMILY MEDICINE

## 2023-08-25 PROCEDURE — 3074F SYST BP LT 130 MM HG: CPT | Performed by: FAMILY MEDICINE

## 2023-08-25 PROCEDURE — 3079F DIAST BP 80-89 MM HG: CPT | Performed by: FAMILY MEDICINE

## 2023-08-25 PROCEDURE — 1036F TOBACCO NON-USER: CPT | Performed by: FAMILY MEDICINE

## 2023-08-25 PROCEDURE — 1160F RVW MEDS BY RX/DR IN RCRD: CPT | Performed by: FAMILY MEDICINE

## 2023-08-25 PROCEDURE — 99213 OFFICE O/P EST LOW 20 MIN: CPT | Performed by: FAMILY MEDICINE

## 2023-08-25 RX ORDER — PREDNISONE 10 MG/1
TABLET ORAL
Qty: 30 TABLET | Refills: 0 | Status: SHIPPED | OUTPATIENT
Start: 2023-08-25 | End: 2023-09-05

## 2023-08-25 NOTE — PROGRESS NOTES
"Subjective   Patient ID: Sofya Dunaway is a 73 y.o. female who presents for Arm Pain.    HPI    Patient presents today for right arm pain.  Ongoing for 8 to 9 days  Pt has limited range of motion in the right arm.  Denies injury   Describes the pain as very sharp pain  Pt has  tried OTC ICYHOT and Motrin minimal to some relief       Review of systems  ; Patient seen today for exam denies any problems with headaches or vision, denies any shortness of breath chest pain nausea or vomiting, no black stool no blood in the stool no heartburn type symptoms denies any problems with constipation or diarrhea, and no dysuria-type symptoms    The patient's allergies medications were reviewed with them today    The patient's social family and surgical history or also reviewed here today, along with her past medical history.     Objective     Alert and active in  no acute distress  HEENT TMs clear oropharynx negative nares clear no drainage noted neck supple  With no adenopathy   Heart regular rate and rhythm without murmur and no carotid bruits  Lungs- clear to auscultation bilaterally, no wheeze or rhonchi noted  Thyroid -negative masses or nodularity  Abdomen- soft times four quadrants , bowel sounds positive no masses or organomegaly, negative tenderness guarding or rebound  Neurological exam unremarkable- DTRs in upper and lower extremities within normal limits.   skin -no lesions noted    Right shoulder positive pain with internal/external rotation consistent with bursitis//neurovascular intact all throughout      /82 (BP Location: Left arm, Patient Position: Sitting, BP Cuff Size: Adult)   Pulse 69   Temp 36.4 °C (97.5 °F) (Temporal)   Resp 16   Ht 1.676 m (5' 6\")   Wt 133 kg (294 lb)   SpO2 96%   BMI 47.45 kg/m²     Allergies   Allergen Reactions    Itraconazole Rash       Assessment/Plan   Problem List Items Addressed This Visit       HTN (hypertension)     Other Visit Diagnoses       Acute shoulder " bursitis, right    -  Primary    Relevant Medications    predniSONE (Deltasone) 10 mg tablet    Right arm pain            Went ahead and increase her motion once the steroids kick in I like to use some heat twice a day if things worsen or change she will need an x-ray of her right shoulder and based on that whether it is osteoarthritis possibly an injection or orthopedic opinion      If anything worsens or changes please call us at once, follow up in the office as planned,

## 2023-08-30 ENCOUNTER — TELEPHONE (OUTPATIENT)
Dept: PRIMARY CARE | Facility: CLINIC | Age: 73
End: 2023-08-30
Payer: MEDICARE

## 2023-08-30 DIAGNOSIS — B37.31 YEAST VAGINITIS: Primary | ICD-10-CM

## 2023-08-30 RX ORDER — FLUCONAZOLE 150 MG/1
TABLET ORAL
Qty: 2 TABLET | Refills: 0 | Status: SHIPPED | OUTPATIENT
Start: 2023-08-30 | End: 2024-05-01 | Stop reason: SDUPTHER

## 2023-08-30 NOTE — TELEPHONE ENCOUNTER
Jose Tobias MD  [id number] [place] [id number] Clinical Support Staff34 minutes ago (12:30 PM)       Please give her a call and let her know I pended the yeast infection medication Diflucan.  The computer says she was allergic to something similar for yeast infection.  Please clarify that.  We may have to switch to Lamisil.  Please let her know     I called patient. VM not set up

## 2023-08-30 NOTE — TELEPHONE ENCOUNTER
PATIENT CALLING, SHE SAW DR. BUI ON 08/25/2023 AND WAS PRESCRIBED A STEROID. SHE STATES THAT SHE NOW HAS A YEAST INFECTION AND IS WANTING TO KNOW IF SOMETHING CAN BE CALLED IN FOR THAT. PLEASE ADVISE.

## 2023-08-30 NOTE — TELEPHONE ENCOUNTER
Jose Tobias MD  [id number] [place] [id number] Clinical Support Staff3 minutes ago (4:00 PM)       E prescribed med to their pharmacy.  Call patient and inform.   Patient aware

## 2023-08-30 NOTE — TELEPHONE ENCOUNTER
Pt calling, she states that she is allergic to a toe nail medication, Sporanox. She has used Diflucan in the past. She states that she is not allergic.    Please advise

## 2023-09-05 DIAGNOSIS — K21.9 GASTROESOPHAGEAL REFLUX DISEASE WITHOUT ESOPHAGITIS: ICD-10-CM

## 2023-09-05 RX ORDER — OMEPRAZOLE 20 MG/1
40 CAPSULE, DELAYED RELEASE ORAL DAILY
Qty: 60 CAPSULE | Refills: 5 | Status: SHIPPED | OUTPATIENT
Start: 2023-09-05

## 2023-09-14 NOTE — PROGRESS NOTES
Subjective   Patient ID: Sofya Dunaway is a 73 y.o. female who presents for No chief complaint on file..  HPI  Patient presents today for a Medicare AWV, and follow-up on Diabetes, Hypertension, and Hyperlipidemia. *** follow a low sugar, low sodium, and low fat diet. Exercises ***. *** fasting for blood work.      Taking current medications which were reviewed.  Problem list discussed.    Overall doing well.  Eating okay.  Staying active.    Has no other new problem /question.    ROS  Constitutional- No activity change. No appetite change.  Eyes- Denies vision changes.  Respiratory- No shortness of breath.  Cardiovascular- No palpitations. No chest pain.  GI- No nausea or vomiting. No diarrhea or constipation. Denies abdominal pain.  Musculoskeletal- Denies joint swelling.  Extremities- No edema.  Neurological- Denies headaches. Denies dizziness.  Skin- No rashes.  Psychiatric/Behavioral- Denies significant anxiety, or depressed mood.     Objective     There were no vitals taken for this visit.    Allergies   Allergen Reactions    Itraconazole Rash       Constitutional-- Well-nourished.  No distress  Head- unremarkable.  Ears- TMs clear.  Eyes- PERRL.  Conjunctiva normal.  Nose- Normal.  No rhinorrhea noted.  Throat- Oropharynx is clear and moist.  Neck- Supple with no thyromegaly.  No significant cervical adenopathy noted.  Pulmonary/Chest- Breath sounds normal with normal effort.  No wheezing.  Heart- Regular rate and rhythm.  No murmur.  Abdomen- Soft and non-tender.  No masses noted.  Musculoskeletal- Normal ROM.  No significant joint swelling  Extremities- No edema.   Neurological- Alert.  No noted deficits.  Skin- Warm.  No rashes.  Psychiatric/Behavioral- Mood and affect normal.  Behavior normal.     Assessment/Plan   1. Medicare annual wellness visit, subsequent        2. Primary hypertension        3. Hyperlipidemia, unspecified hyperlipidemia type        4. Anxiety        5. Mild depression        6.  Primary osteoarthritis involving multiple joints        7. Type 2 diabetes mellitus without complication, without long-term current use of insulin (CMS/[facility])               Long talk. Treatment options reviewed.    Medicare wellness questionnaire reviewed in detail.   No problems with activities of daily living. Home safety issues reviewed.   Reminded to have an updated will if needed.    Continue and take your medications as prescribed.    Health Maintenance issues discussed.    Importance of healthy diet and regular exercise regimen discussed.    We will contact you with any test results ordered. If you do not hear from us, please contact.    Follow-up as instructed or sooner if any problems or symptoms do not resolve as expected.

## 2023-09-15 ENCOUNTER — APPOINTMENT (OUTPATIENT)
Dept: PRIMARY CARE | Facility: CLINIC | Age: 73
End: 2023-09-15
Payer: MEDICARE

## 2023-09-20 DIAGNOSIS — E78.5 HYPERLIPIDEMIA, UNSPECIFIED HYPERLIPIDEMIA TYPE: ICD-10-CM

## 2023-09-20 RX ORDER — ATORVASTATIN CALCIUM 10 MG/1
TABLET, FILM COATED ORAL
Qty: 45 TABLET | Refills: 0 | Status: SHIPPED | OUTPATIENT
Start: 2023-09-20 | End: 2024-05-01 | Stop reason: SDUPTHER

## 2023-12-21 DIAGNOSIS — M79.89 LEG SWELLING: ICD-10-CM

## 2023-12-21 DIAGNOSIS — G20.A1 PARKINSON DISEASE (MULTI): ICD-10-CM

## 2023-12-21 RX ORDER — CITALOPRAM 20 MG/1
20 TABLET, FILM COATED ORAL DAILY
Qty: 90 TABLET | Refills: 0 | Status: SHIPPED | OUTPATIENT
Start: 2023-12-21 | End: 2024-02-23 | Stop reason: SDUPTHER

## 2023-12-21 RX ORDER — TRIAMTERENE/HYDROCHLOROTHIAZID 37.5-25 MG
TABLET ORAL
Qty: 30 TABLET | Refills: 1 | Status: SHIPPED | OUTPATIENT
Start: 2023-12-21

## 2023-12-21 NOTE — TELEPHONE ENCOUNTER
Rx Refill Request Telephone Encounter    Name:  Sofya Dunaway  : 1950     Medication Name:  triamterene-hydrochlorothiazid (Maxzide-25) 37.5-25 mg tablet   Quantity (Optional):    30 Refill: 1  Directions (Optional):   1/2 to 1 pill daily as needed for swelling.     Medication Name:  citalopram (CeleXA) 20 mg tablet   Quantity (Optional):    90  Directions (Optional):   TAKE 1 TABLET BY MOUTH EVERY DAY     Specific Pharmacy location:  [id number] [address]   Date of last appointment:

## 2024-01-02 ENCOUNTER — TELEPHONE (OUTPATIENT)
Dept: PRIMARY CARE | Facility: CLINIC | Age: 74
End: 2024-01-02
Payer: MEDICARE

## 2024-01-02 DIAGNOSIS — B37.31 YEAST VAGINITIS: ICD-10-CM

## 2024-01-02 RX ORDER — FLUCONAZOLE 150 MG/1
150 TABLET ORAL ONCE
Qty: 1 TABLET | Refills: 1 | Status: SHIPPED | OUTPATIENT
Start: 2024-01-02 | End: 2024-01-02

## 2024-01-02 NOTE — TELEPHONE ENCOUNTER
Pt calling, she states that she has a yeast infection. She wanted to know if you could call in something for this?     She set up an appointment on 1/19/24 because she hasn't seen you since 8/22    Please advise    Pharmacy: [facility] [place]

## 2024-01-19 ENCOUNTER — APPOINTMENT (OUTPATIENT)
Dept: PRIMARY CARE | Facility: CLINIC | Age: 74
End: 2024-01-19
Payer: MEDICARE

## 2024-01-23 DIAGNOSIS — E11.9 TYPE 2 DIABETES MELLITUS WITHOUT COMPLICATION, WITHOUT LONG-TERM CURRENT USE OF INSULIN (MULTI): ICD-10-CM

## 2024-01-23 DIAGNOSIS — N32.81 OAB (OVERACTIVE BLADDER): ICD-10-CM

## 2024-01-23 RX ORDER — SOLIFENACIN SUCCINATE 5 MG/1
5 TABLET, FILM COATED ORAL DAILY
Qty: 30 TABLET | Refills: 0 | Status: SHIPPED | OUTPATIENT
Start: 2024-01-23 | End: 2024-05-01 | Stop reason: SDUPTHER

## 2024-01-23 RX ORDER — PIOGLITAZONEHYDROCHLORIDE 45 MG/1
45 TABLET ORAL DAILY
Qty: 30 TABLET | Refills: 0 | Status: SHIPPED | OUTPATIENT
Start: 2024-01-23 | End: 2024-05-01 | Stop reason: SDUPTHER

## 2024-01-23 NOTE — TELEPHONE ENCOUNTER
Rx Controlled Refill Request Telephone Encounter    Name: Sofya Dunaway  :  1950    Medication Name:   solifenacin (VESIcare)   Dose (Optional):   5 MG   Quantity (Optional):   30  Directions (Optional):   TAKE 1 TABLET BY MOUTH ONCE DAILY    ALLERGIES:    ON FILE     Specific Pharmacy location:    [facility] [place]     Date of last appointment:    23  Date of next appointment:    24    Best number to reach patient:    [phone]      MEDICATION NAME:  PIOGLITAZONE   DOSAGE:  45 MG TABLET

## 2024-01-29 ENCOUNTER — TELEPHONE (OUTPATIENT)
Dept: PRIMARY CARE | Facility: CLINIC | Age: 74
End: 2024-01-29
Payer: MEDICARE

## 2024-01-29 NOTE — TELEPHONE ENCOUNTER
Pt calling, she wanted to schedule an appointment with you. She mentioned that she was very depressed. Did you want to see her this week sometime?    Please advise

## 2024-02-02 ENCOUNTER — APPOINTMENT (OUTPATIENT)
Dept: PRIMARY CARE | Facility: CLINIC | Age: 74
End: 2024-02-02
Payer: MEDICARE

## 2024-02-23 DIAGNOSIS — G20.A1 PARKINSON DISEASE (MULTI): ICD-10-CM

## 2024-02-23 RX ORDER — CITALOPRAM 20 MG/1
20 TABLET, FILM COATED ORAL DAILY
Qty: 30 TABLET | Refills: 0 | Status: SHIPPED | OUTPATIENT
Start: 2024-02-23 | End: 2024-05-17 | Stop reason: SDUPTHER

## 2024-02-23 NOTE — TELEPHONE ENCOUNTER
Patient scheduled an appointment for 24  Requesting a short supply     Rx Refill Request Telephone Encounter    Name:  Sofya Dunaway  : 1950     Medication Name:  Celexa  Dose (Optional):    20 mg  Quantity (Optional):      Directions (Optional):   Take 1 tablet (20mg) by mouth once daily     ALLERGIES:   see list     Specific Pharmacy location:  [facility]     Date of last appointment:  23  Date of next appointment:  24    Best number to reach patient:  [phone]

## 2024-02-28 ENCOUNTER — APPOINTMENT (OUTPATIENT)
Dept: PRIMARY CARE | Facility: CLINIC | Age: 74
End: 2024-02-28
Payer: MEDICARE

## 2024-02-29 NOTE — PROGRESS NOTES
Subjective   Patient ID: Sofya Dunaway is a 74 y.o. female who presents for No chief complaint on file..  HPI  Patient presents today for a Medicare AWV, and follow-up on Diabetes, Hypertension, and Hyperlipidemia. *** follow a low sugar, low sodium, and low fat diet. *** check her sugar and/or BP at home. Exercises ***. *** fasting for blood work.      Taking current medications which were reviewed.  Problem list discussed.    Overall doing well.  Eating okay.  Staying active.    Has no other new problem /question.    ROS  Constitutional- No activity change. No appetite change.  Eyes- Denies vision changes.  Respiratory- No shortness of breath.  Cardiovascular- No palpitations. No chest pain.  GI- No nausea or vomiting. No diarrhea or constipation. Denies abdominal pain.  Musculoskeletal- Denies joint swelling.  Extremities- No edema.  Neurological- Denies headaches. Denies dizziness.  Skin- No rashes.  Psychiatric/Behavioral- Denies significant anxiety, or depressed mood.     Objective     There were no vitals taken for this visit.    Allergies   Allergen Reactions    Itraconazole Rash       Constitutional-- Well-nourished.  No distress  Head- unremarkable.  Ears- TMs clear.  Eyes- PERRL.  Conjunctiva normal.  Nose- Normal.  No rhinorrhea noted.  Throat- Oropharynx is clear and moist.  Neck- Supple with no thyromegaly.  No significant cervical adenopathy noted.  Pulmonary/Chest- Breath sounds normal with normal effort.  No wheezing.  Heart- Regular rate and rhythm.  No murmur.  Abdomen- Soft and non-tender.  No masses noted.  Musculoskeletal- Normal ROM.  No significant joint swelling  Extremities- No edema.   Neurological- Alert.  No noted deficits.  Skin- Warm.  No rashes.  Psychiatric/Behavioral- Mood and affect normal.  Behavior normal.     Assessment/Plan   1. Medicare annual wellness visit, subsequent        2. Type 2 diabetes mellitus without complication, without long-term current use of insulin  (CMS/[facility])        3. Primary hypertension        4. Hyperlipidemia, unspecified hyperlipidemia type        5. Primary osteoarthritis involving multiple joints               Long talk. Treatment options reviewed.    Medicare wellness questionnaire reviewed in detail.   No problems with activities of daily living. Home safety issues reviewed.   Advanced care planning discussed with patient.  Reminded to have an updated will if needed.    Continue and take your medications as prescribed.    Health Maintenance issues discussed.    Importance of healthy diet and regular exercise regimen discussed.    We will contact you with any test results ordered. If you do not hear from us, please contact.    Follow-up as instructed or sooner if any problems or symptoms do not resolve as expected.

## 2024-03-04 ENCOUNTER — APPOINTMENT (OUTPATIENT)
Dept: PRIMARY CARE | Facility: CLINIC | Age: 74
End: 2024-03-04
Payer: MEDICARE

## 2024-03-25 ENCOUNTER — TELEPHONE (OUTPATIENT)
Dept: PHARMACY | Facility: HOSPITAL | Age: 74
End: 2024-03-25
Payer: MEDICARE

## 2024-03-25 NOTE — TELEPHONE ENCOUNTER
Population Health: Outreach by Ambulatory Pharmacy Team    Payor: René HOLLAND  Reason: Adherence  Medication: pioglitazone 45 mg   Outcome: Left Voicemail    Additional Notes: No recent PCP visit (last 08/2023). No indication that medication was stopped/discontinued. Attempted to contact patient regarding increasing day supply to 100 days with 1 refill, determine if there are any questions/concerns with the medication, or switching medication to  Home Delivery pharmacy.     Lavern Galeana, PharmD

## 2024-04-24 ENCOUNTER — APPOINTMENT (OUTPATIENT)
Dept: PRIMARY CARE | Facility: CLINIC | Age: 74
End: 2024-04-24
Payer: MEDICARE

## 2024-04-24 NOTE — PROGRESS NOTES
Subjective   Patient ID: Sofya Dunaway is a 74 y.o. female who presents for No chief complaint on file..  HPI  Patient presents today for a Medicare AWV, and a follow-up on Diabetes, Hypertension, and Hyperlipidemia. *** follow a low sugar, low sodium, and low fat diet. *** check sugar and/or BP at home. Exercises ***. Is not fasting for blood work.      Taking current medications which were reviewed.  Problem list discussed.    Overall doing well.  Eating okay.  Staying active.    Has no other new problem /question.    ROS  Constitutional- No activity change. No appetite change.  Eyes- Denies vision changes.  Respiratory- No shortness of breath.  Cardiovascular- No palpitations. No chest pain.  GI- No nausea or vomiting. No diarrhea or constipation. Denies abdominal pain.  Musculoskeletal- Denies joint swelling.  Extremities- No edema.  Neurological- Denies headaches. Denies dizziness.  Skin- No rashes.  Psychiatric/Behavioral- Denies significant anxiety, or depressed mood.     Objective     There were no vitals taken for this visit.    Allergies   Allergen Reactions    Itraconazole Rash       Constitutional-- Well-nourished.  No distress  Head- unremarkable.  Ears- TMs clear.  Eyes- PERRL.  Conjunctiva normal.  Nose- Normal.  No rhinorrhea noted.  Throat- Oropharynx is clear and moist.  Neck- Supple with no thyromegaly.  No significant cervical adenopathy noted.  Pulmonary/Chest- Breath sounds normal with normal effort.  No wheezing.  Heart- Regular rate and rhythm.  No murmur.  Abdomen- Soft and non-tender.  No masses noted.  Musculoskeletal- Normal ROM.  No significant joint swelling  Extremities- No edema.   Neurological- Alert.  No noted deficits.  Skin- Warm.  No rashes.  Psychiatric/Behavioral- Mood and affect normal.  Behavior normal.     Assessment/Plan   1. Medicare annual wellness visit, subsequent        2. Type 2 diabetes mellitus without complication, without long-term current use of insulin (Multi)         3. Primary hypertension        4. Hyperlipidemia, unspecified hyperlipidemia type        5. Primary osteoarthritis involving multiple joints        6. Gastroesophageal reflux disease without esophagitis               Long talk. Treatment options reviewed.    Medicare wellness questionnaire reviewed in detail.   No problems with activities of daily living. Home safety issues reviewed.   Advanced care planning discussed with patient.  Reminded to have an updated will if needed.    Continue and take your medications as prescribed.    Health Maintenance issues discussed.    Importance of healthy diet and regular exercise regimen discussed.    We will contact you with any test results ordered. If you do not hear from us, please contact.    Follow-up as instructed or sooner if any problems or symptoms do not resolve as expected.

## 2024-05-01 ENCOUNTER — OFFICE VISIT (OUTPATIENT)
Dept: PRIMARY CARE | Facility: CLINIC | Age: 74
End: 2024-05-01
Payer: MEDICARE

## 2024-05-01 VITALS
WEIGHT: 287.5 LBS | HEIGHT: 66 IN | DIASTOLIC BLOOD PRESSURE: 72 MMHG | RESPIRATION RATE: 18 BRPM | SYSTOLIC BLOOD PRESSURE: 120 MMHG | BODY MASS INDEX: 46.21 KG/M2 | HEART RATE: 87 BPM | OXYGEN SATURATION: 98 %

## 2024-05-01 DIAGNOSIS — M15.9 OSTEOARTHRITIS OF MULTIPLE JOINTS, UNSPECIFIED OSTEOARTHRITIS TYPE: ICD-10-CM

## 2024-05-01 DIAGNOSIS — B37.31 YEAST VAGINITIS: ICD-10-CM

## 2024-05-01 DIAGNOSIS — E11.9 TYPE 2 DIABETES MELLITUS WITHOUT COMPLICATION, WITHOUT LONG-TERM CURRENT USE OF INSULIN (MULTI): ICD-10-CM

## 2024-05-01 DIAGNOSIS — Z12.31 BREAST CANCER SCREENING BY MAMMOGRAM: ICD-10-CM

## 2024-05-01 DIAGNOSIS — Z00.00 MEDICARE ANNUAL WELLNESS VISIT, SUBSEQUENT: Primary | ICD-10-CM

## 2024-05-01 DIAGNOSIS — E78.5 HYPERLIPIDEMIA, UNSPECIFIED HYPERLIPIDEMIA TYPE: ICD-10-CM

## 2024-05-01 DIAGNOSIS — I10 PRIMARY HYPERTENSION: ICD-10-CM

## 2024-05-01 DIAGNOSIS — E78.2 MIXED HYPERLIPIDEMIA: ICD-10-CM

## 2024-05-01 DIAGNOSIS — N32.81 OAB (OVERACTIVE BLADDER): ICD-10-CM

## 2024-05-01 PROCEDURE — G0439 PPPS, SUBSEQ VISIT: HCPCS | Performed by: FAMILY MEDICINE

## 2024-05-01 PROCEDURE — 3008F BODY MASS INDEX DOCD: CPT | Performed by: FAMILY MEDICINE

## 2024-05-01 PROCEDURE — 1170F FXNL STATUS ASSESSED: CPT | Performed by: FAMILY MEDICINE

## 2024-05-01 PROCEDURE — 3078F DIAST BP <80 MM HG: CPT | Performed by: FAMILY MEDICINE

## 2024-05-01 PROCEDURE — 1160F RVW MEDS BY RX/DR IN RCRD: CPT | Performed by: FAMILY MEDICINE

## 2024-05-01 PROCEDURE — 1124F ACP DISCUSS-NO DSCNMKR DOCD: CPT | Performed by: FAMILY MEDICINE

## 2024-05-01 PROCEDURE — 1159F MED LIST DOCD IN RCRD: CPT | Performed by: FAMILY MEDICINE

## 2024-05-01 PROCEDURE — 3074F SYST BP LT 130 MM HG: CPT | Performed by: FAMILY MEDICINE

## 2024-05-01 PROCEDURE — 99213 OFFICE O/P EST LOW 20 MIN: CPT | Performed by: FAMILY MEDICINE

## 2024-05-01 RX ORDER — FLUCONAZOLE 150 MG/1
TABLET ORAL
Qty: 2 TABLET | Refills: 1 | Status: SHIPPED | OUTPATIENT
Start: 2024-05-01

## 2024-05-01 RX ORDER — PIOGLITAZONEHYDROCHLORIDE 45 MG/1
45 TABLET ORAL DAILY
Qty: 30 TABLET | Refills: 3 | Status: SHIPPED | OUTPATIENT
Start: 2024-05-01

## 2024-05-01 RX ORDER — DIAZEPAM 5 MG/1
5 TABLET ORAL EVERY 8 HOURS PRN
COMMUNITY

## 2024-05-01 RX ORDER — ATORVASTATIN CALCIUM 10 MG/1
TABLET, FILM COATED ORAL
Qty: 45 TABLET | Refills: 1 | Status: SHIPPED | OUTPATIENT
Start: 2024-05-01

## 2024-05-01 RX ORDER — SOLIFENACIN SUCCINATE 5 MG/1
5 TABLET, FILM COATED ORAL DAILY
Qty: 30 TABLET | Refills: 3 | Status: SHIPPED | OUTPATIENT
Start: 2024-05-01

## 2024-05-01 RX ORDER — METHYLPREDNISOLONE 4 MG/1
TABLET ORAL
Qty: 21 TABLET | Refills: 0 | Status: SHIPPED | OUTPATIENT
Start: 2024-05-01

## 2024-05-01 RX ORDER — DEXTROSE 4 G
TABLET,CHEWABLE ORAL
Qty: 1 EACH | Refills: 0 | Status: SHIPPED | OUTPATIENT
Start: 2024-05-01

## 2024-05-01 ASSESSMENT — PATIENT HEALTH QUESTIONNAIRE - PHQ9
2. FEELING DOWN, DEPRESSED OR HOPELESS: NOT AT ALL
SUM OF ALL RESPONSES TO PHQ9 QUESTIONS 1 AND 2: 0
1. LITTLE INTEREST OR PLEASURE IN DOING THINGS: NOT AT ALL

## 2024-05-01 ASSESSMENT — ACTIVITIES OF DAILY LIVING (ADL)
BATHING: INDEPENDENT
GROCERY_SHOPPING: INDEPENDENT
MANAGING_FINANCES: INDEPENDENT
DOING_HOUSEWORK: INDEPENDENT
TAKING_MEDICATION: INDEPENDENT
DRESSING: INDEPENDENT

## 2024-05-01 ASSESSMENT — ENCOUNTER SYMPTOMS
DEPRESSION: 0
OCCASIONAL FEELINGS OF UNSTEADINESS: 1
LOSS OF SENSATION IN FEET: 0

## 2024-05-01 NOTE — PROGRESS NOTES
"Subjective   Patient ID: Sofya Dunaway is a 74 y.o. female who presents for Medicare Annual Wellness Visit Subsequent and Anxiety.  HPI    AWV    Anxiety follow up  Taking the Diazepam  Working well    90 % effective   Denies any side effects   OAR[place] reviewed today   Last took unknown    Patient presents in office today for her balance. Patient admits to using a cane and walker at home.     Taking current medications which were reviewed.  Problem list discussed.    Overall doing well.  Eating okay.  Staying active.    Has no other new problem /question.     ROS  Constitutional- No activity change. No appetite change.  Eyes- Denies vision changes.  Respiratory- No shortness of breath.  Cardiovascular- No palpitations. No chest pain.  GI- No nausea or vomiting. No diarrhea or constipation. Denies abdominal pain.  Musculoskeletal- Denies joint swelling.  Extremities- No edema.  Neurological- Denies headaches. Denies dizziness.  Skin- No rashes.  Psychiatric/Behavioral- Denies significant anxiety, or depressed mood.     Objective     /72   Pulse 87   Resp 18   Ht 1.676 m (5' 6\")   Wt 130 kg (287 lb 8 oz)   SpO2 98%   BMI 46.40 kg/m²     Allergies   Allergen Reactions    Itraconazole Rash       Constitutional-- Well-nourished.  No distress  Head- unremarkable.  Ears- TMs clear.  Eyes- PERRL.  Conjunctiva normal.  Nose- Normal.  No rhinorrhea noted.  Throat- Oropharynx is clear and moist.  Neck- Supple with no thyromegaly.  No significant cervical adenopathy noted.  Pulmonary/Chest- Breath sounds normal with normal effort.  No wheezing.  Heart- Regular rate and rhythm.  No murmur.  Abdomen- Soft and non-tender.  No masses noted.  Musculoskeletal- Normal ROM.  No significant joint swelling  Extremities- No edema.   Neurological- Alert.  No noted deficits.  Skin- Warm.  No rashes.  Psychiatric/Behavioral- Mood and affect normal.  Behavior normal.     Assessment/Plan   1. Medicare annual wellness visit, " subsequent        2. Primary hypertension  Comprehensive Metabolic Panel      3. Mixed hyperlipidemia        4. Type 2 diabetes mellitus without complication, without long-term current use of insulin (Multi)  Hemoglobin A1C    Albumin , Urine Random    pioglitazone (Actos) 45 mg tablet    blood-glucose meter misc      5. Breast cancer screening by mammogram  BI mammo bilateral screening tomosynthesis      6. OAB (overactive bladder)  solifenacin (VESIcare) 5 mg tablet      7. Hyperlipidemia, unspecified hyperlipidemia type  atorvastatin (Lipitor) 10 mg tablet      8. Body mass index (BMI) 45.0-49.9, adult (Multi)        9. Osteoarthritis of multiple joints, unspecified osteoarthritis type  methylPREDNISolone (Medrol Dospak) 4 mg tablets      10. Yeast vaginitis  fluconazole (Diflucan) 150 mg tablet             Long talk. Treatment options reviewed.    Medicare wellness questionnaire reviewed in detail. Advanced Directives reviewed today, Importance of having Advance care planing discussed. Patient advised to provide the office with a copy if has not already done so. No problems with activities of daily living. Falls risks reviewed.      Reviewed most recent lab work with patient. Advised patient to remain up to date on routine maintenance and health screening.      Maintain appointments with specialists as scheduled.      Discussed yeast vaginitis. Take Diflucan as discussed.     Discussed hypertension. Will continue to monitor.     Osteoarthritis controlled. Educated the patient on osteoarthritis care and management. Educated on muscle strength and exercise.    Complete blood work as discussed.  Advised patient to remain properly hydrated.     Continue and take your medications as prescribed.    Health Maintenance issues discussed.    Importance of healthy diet and regular exercise regimen discussed.    We will contact you with any test results ordered. If you do not hear from us, please contact.    Follow-up as  instructed or sooner if any problems or symptoms do not resolve as expected.          Scribe Attestation  By signing my name below, ITania Scribe   attest that this documentation has been prepared under the direction and in the presence of Jose Tobias MD.

## 2024-05-17 DIAGNOSIS — G20.A1 PARKINSON DISEASE (MULTI): ICD-10-CM

## 2024-05-17 RX ORDER — CITALOPRAM 20 MG/1
20 TABLET, FILM COATED ORAL DAILY
Qty: 90 TABLET | Refills: 1 | Status: SHIPPED | OUTPATIENT
Start: 2024-05-17

## 2024-07-24 DIAGNOSIS — K21.9 GASTROESOPHAGEAL REFLUX DISEASE WITHOUT ESOPHAGITIS: ICD-10-CM

## 2024-07-24 RX ORDER — OMEPRAZOLE 20 MG/1
40 CAPSULE, DELAYED RELEASE ORAL DAILY
Qty: 60 CAPSULE | Refills: 5 | Status: SHIPPED | OUTPATIENT
Start: 2024-07-24

## 2024-07-29 ENCOUNTER — OFFICE VISIT (OUTPATIENT)
Dept: PRIMARY CARE | Facility: CLINIC | Age: 74
End: 2024-07-29
Payer: MEDICARE

## 2024-07-29 VITALS
SYSTOLIC BLOOD PRESSURE: 124 MMHG | BODY MASS INDEX: 45.1 KG/M2 | HEART RATE: 78 BPM | WEIGHT: 280.6 LBS | OXYGEN SATURATION: 97 % | RESPIRATION RATE: 16 BRPM | DIASTOLIC BLOOD PRESSURE: 84 MMHG | TEMPERATURE: 96.2 F | HEIGHT: 66 IN

## 2024-07-29 DIAGNOSIS — I10 PRIMARY HYPERTENSION: ICD-10-CM

## 2024-07-29 DIAGNOSIS — E78.5 HYPERLIPIDEMIA, UNSPECIFIED HYPERLIPIDEMIA TYPE: ICD-10-CM

## 2024-07-29 DIAGNOSIS — E11.9 TYPE 2 DIABETES MELLITUS WITHOUT COMPLICATION, WITHOUT LONG-TERM CURRENT USE OF INSULIN (MULTI): ICD-10-CM

## 2024-07-29 DIAGNOSIS — M25.559 HIP PAIN, UNSPECIFIED LATERALITY: ICD-10-CM

## 2024-07-29 PROCEDURE — 3074F SYST BP LT 130 MM HG: CPT | Performed by: FAMILY MEDICINE

## 2024-07-29 PROCEDURE — 3079F DIAST BP 80-89 MM HG: CPT | Performed by: FAMILY MEDICINE

## 2024-07-29 PROCEDURE — 1159F MED LIST DOCD IN RCRD: CPT | Performed by: FAMILY MEDICINE

## 2024-07-29 PROCEDURE — 3008F BODY MASS INDEX DOCD: CPT | Performed by: FAMILY MEDICINE

## 2024-07-29 PROCEDURE — 1036F TOBACCO NON-USER: CPT | Performed by: FAMILY MEDICINE

## 2024-07-29 PROCEDURE — 99213 OFFICE O/P EST LOW 20 MIN: CPT | Performed by: FAMILY MEDICINE

## 2024-07-29 RX ORDER — HYDROCODONE BITARTRATE AND ACETAMINOPHEN 5; 325 MG/1; MG/1
1 TABLET ORAL EVERY 8 HOURS PRN
Qty: 21 TABLET | Refills: 0 | Status: SHIPPED | OUTPATIENT
Start: 2024-07-29 | End: 2024-08-05

## 2024-07-29 RX ORDER — METHYLPREDNISOLONE 4 MG/1
TABLET ORAL
Qty: 21 TABLET | Refills: 0 | Status: SHIPPED | OUTPATIENT
Start: 2024-07-29

## 2024-07-29 ASSESSMENT — ENCOUNTER SYMPTOMS
DEPRESSION: 0
LOSS OF SENSATION IN FEET: 0
OCCASIONAL FEELINGS OF UNSTEADINESS: 0

## 2024-07-29 ASSESSMENT — PATIENT HEALTH QUESTIONNAIRE - PHQ9
1. LITTLE INTEREST OR PLEASURE IN DOING THINGS: NOT AT ALL
SUM OF ALL RESPONSES TO PHQ9 QUESTIONS 1 & 2: 0
2. FEELING DOWN, DEPRESSED OR HOPELESS: NOT AT ALL

## 2024-07-29 NOTE — PROGRESS NOTES
Subjective   Patient ID: Sofya Dunaway is a 74 y.o. female who presents for Hip Pain.  HPI    Patient presents in office today for severe hip pain. Ongoing x 4-5 days worse last night. States that this is the right hip. Pain is 8/10. Patient has tried Tylenol OTC.     Review of Systems  Constitutional:  no chills, no fever and no night sweats.  Eyes: no blurred vision and no eyesight problems.  ENT: no hearing loss, no nasal congestion, no hoarseness and no sore throat.  Neck: no mass (es) and no swelling.  Cardiovascular: no chest pain, no intermittent leg claudication, no lower extremity edema, no palpitation and no syncope.  Respiratory: no cough, no shortness of breath during exertion, no shortness of breath at rest and no wheezing.  Gastrointestinal: no abdominal pain, no blood in stools, no constipation, no diarrhea, no melena, no nausea, no rectal pain and no vomiting.  Genitourinary: no dysuria, no change in urinary frequency, no urinary hesitancy and no feelings of urinary urgency.  Musculoskeletal: no arthralgias, no back pain and no myalgias.  Integumentary: no new skin lesions and no rashes.  Neurological: no difficulty walking, no headache, no limb weakness, no numbness and no tingling.  Psychiatric/Behavioral: no anxiety, no depression, no anhedonia and no substance use disorders.  Endocrine: no recent weight gain and no recent weight loss.  Hematologic/Lymphatic: no tendency for easy bruising and no swollen glands    Objective   Physical Exam  Patient in with complaint of right-sided low back pain lateral hip pain.  No trauma no increased activity exam morbidly obese female in no acute distress pain and tenderness right SI joint and paralumbar musculature on the right side pain in the right bursa and lateral thigh musculature.  No fall no injury no increased activity.  She has not tried anything for this except Tylenol.  No other changes from her chronic problems.  There were no vitals taken for  this visit.    Lab Results   Component Value Date    WBC 6.4 08/24/2022    HGB 13.7 08/24/2022    HCT 42.9 08/24/2022    MCV 93 08/24/2022     08/24/2022       Assessment/Plan plan Medrol Dosepak 5 mg hydrocodone every 8 hours as needed heat versus ice whatever feels better if not improving may need physical therapy.  Problem List Items Addressed This Visit       HTN (hypertension)    Hyperlipidemia    Type 2 diabetes mellitus without complication, without long-term current use of insulin (Multi)     Other Visit Diagnoses       Hip pain, unspecified laterality

## 2024-08-05 ENCOUNTER — APPOINTMENT (OUTPATIENT)
Dept: RADIOLOGY | Facility: HOSPITAL | Age: 74
End: 2024-08-05
Payer: MEDICAID

## 2024-08-05 ENCOUNTER — HOSPITAL ENCOUNTER (EMERGENCY)
Facility: HOSPITAL | Age: 74
Discharge: HOME | End: 2024-08-05
Payer: MEDICAID

## 2024-08-05 VITALS
OXYGEN SATURATION: 99 % | TEMPERATURE: 97.9 F | RESPIRATION RATE: 18 BRPM | BODY MASS INDEX: 46.28 KG/M2 | HEIGHT: 66 IN | WEIGHT: 288 LBS | HEART RATE: 70 BPM | SYSTOLIC BLOOD PRESSURE: 121 MMHG | DIASTOLIC BLOOD PRESSURE: 65 MMHG

## 2024-08-05 DIAGNOSIS — M54.40 BACK PAIN OF LUMBOSACRAL REGION WITH SCIATICA: Primary | ICD-10-CM

## 2024-08-05 PROCEDURE — 96372 THER/PROPH/DIAG INJ SC/IM: CPT | Performed by: PHYSICIAN ASSISTANT

## 2024-08-05 PROCEDURE — 2500000001 HC RX 250 WO HCPCS SELF ADMINISTERED DRUGS (ALT 637 FOR MEDICARE OP): Performed by: PHYSICIAN ASSISTANT

## 2024-08-05 PROCEDURE — 99285 EMERGENCY DEPT VISIT HI MDM: CPT

## 2024-08-05 PROCEDURE — 72131 CT LUMBAR SPINE W/O DYE: CPT | Mod: FOREIGN READ | Performed by: RADIOLOGY

## 2024-08-05 PROCEDURE — 2500000004 HC RX 250 GENERAL PHARMACY W/ HCPCS (ALT 636 FOR OP/ED): Performed by: PHYSICIAN ASSISTANT

## 2024-08-05 PROCEDURE — 72131 CT LUMBAR SPINE W/O DYE: CPT

## 2024-08-05 PROCEDURE — 73700 CT LOWER EXTREMITY W/O DYE: CPT | Mod: RT

## 2024-08-05 PROCEDURE — 2500000005 HC RX 250 GENERAL PHARMACY W/O HCPCS: Performed by: PHYSICIAN ASSISTANT

## 2024-08-05 PROCEDURE — 73700 CT LOWER EXTREMITY W/O DYE: CPT | Mod: RIGHT SIDE | Performed by: RADIOLOGY

## 2024-08-05 RX ORDER — NALOXONE HYDROCHLORIDE 4 MG/.1ML
4 SPRAY NASAL AS NEEDED
Qty: 2 EACH | Refills: 0 | Status: SHIPPED | OUTPATIENT
Start: 2024-08-05

## 2024-08-05 RX ORDER — HYDROCODONE BITARTRATE AND ACETAMINOPHEN 5; 325 MG/1; MG/1
1 TABLET ORAL ONCE
Status: COMPLETED | OUTPATIENT
Start: 2024-08-05 | End: 2024-08-05

## 2024-08-05 RX ORDER — ONDANSETRON 4 MG/1
4 TABLET, ORALLY DISINTEGRATING ORAL ONCE
Status: COMPLETED | OUTPATIENT
Start: 2024-08-05 | End: 2024-08-05

## 2024-08-05 RX ORDER — HYDROCODONE BITARTRATE AND ACETAMINOPHEN 5; 325 MG/1; MG/1
1 TABLET ORAL EVERY 6 HOURS PRN
Qty: 12 TABLET | Refills: 0 | Status: SHIPPED | OUTPATIENT
Start: 2024-08-05 | End: 2024-08-08

## 2024-08-05 RX ORDER — PREDNISONE 50 MG/1
50 TABLET ORAL DAILY
Qty: 5 TABLET | Refills: 0 | Status: SHIPPED | OUTPATIENT
Start: 2024-08-05 | End: 2024-08-10

## 2024-08-05 RX ORDER — NAPROXEN SODIUM 550 MG/1
550 TABLET ORAL
Qty: 14 TABLET | Refills: 0 | Status: SHIPPED | OUTPATIENT
Start: 2024-08-05 | End: 2024-08-12

## 2024-08-05 ASSESSMENT — COLUMBIA-SUICIDE SEVERITY RATING SCALE - C-SSRS
6. HAVE YOU EVER DONE ANYTHING, STARTED TO DO ANYTHING, OR PREPARED TO DO ANYTHING TO END YOUR LIFE?: NO
1. IN THE PAST MONTH, HAVE YOU WISHED YOU WERE DEAD OR WISHED YOU COULD GO TO SLEEP AND NOT WAKE UP?: NO
2. HAVE YOU ACTUALLY HAD ANY THOUGHTS OF KILLING YOURSELF?: NO

## 2024-08-05 ASSESSMENT — PAIN - FUNCTIONAL ASSESSMENT: PAIN_FUNCTIONAL_ASSESSMENT: 0-10

## 2024-08-05 ASSESSMENT — LIFESTYLE VARIABLES
EVER HAD A DRINK FIRST THING IN THE MORNING TO STEADY YOUR NERVES TO GET RID OF A HANGOVER: NO
HAVE PEOPLE ANNOYED YOU BY CRITICIZING YOUR DRINKING: NO
TOTAL SCORE: 0
HAVE YOU EVER FELT YOU SHOULD CUT DOWN ON YOUR DRINKING: NO
EVER FELT BAD OR GUILTY ABOUT YOUR DRINKING: NO

## 2024-08-05 ASSESSMENT — PAIN SCALES - GENERAL
PAINLEVEL_OUTOF10: 0 - NO PAIN
PAINLEVEL_OUTOF10: 9
PAINLEVEL_OUTOF10: 5 - MODERATE PAIN

## 2024-08-05 NOTE — ED PROVIDER NOTES
HPI   Chief Complaint   Patient presents with    Hip Pain     Right hip pain denies any injury       A 74-year-old female patient comes in the emergency department today with complaints of severe right hip pain.  States there is no fall or injury.  It really started to bother her.  States she did see her PCP who put her on a Medrol Dosepak which seem to be helping but when she finished the pain came back.  She rates pain 9-10 on the pain scale.  States it is hard to bear weight on the right hip.  She otherwise has no other complaints this present time for this purpose comes in the emergency department today further evaluation.              Patient History   Past Medical History:   Diagnosis Date    Body mass index (BMI) 50.0-59.9, adult (Multi) 10/05/2021    BMI 50.0-59.9, adult    Body mass index (BMI) 50.0-59.9, adult (Multi) 10/05/2021    BMI 50.0-59.9, adult    Disorder of the skin and subcutaneous tissue, unspecified 08/19/2019    Skin lesion of neck    Encounter for general adult medical examination without abnormal findings 01/29/2021    Encounter for Medicare annual wellness exam    Encounter for immunization     Encounter for immunization    Encounter for screening for malignant neoplasm of colon 02/04/2021    Screen for colon cancer    Encounter for screening mammogram for malignant neoplasm of breast     Other screening mammogram    Morbid (severe) obesity due to excess calories (Multi) 10/05/2021    Class 3 severe obesity due to excess calories with serious comorbidity and body mass index (BMI) of 50.0 to 59.9 in adult    Morbid (severe) obesity due to excess calories (Multi) 10/05/2021    Class 3 severe obesity due to excess calories with serious comorbidity and body mass index (BMI) of 50.0 to 59.9 in adult    Personal history of other diseases of the respiratory system 01/29/2020    History of upper respiratory infection    Personal history of other drug therapy 01/29/2021    History of influenza  vaccination    Personal history of other medical treatment 01/29/2021    History of screening mammography     History reviewed. No pertinent surgical history.  Family History   Problem Relation Name Age of Onset    Cancer Mother       Social History     Tobacco Use    Smoking status: Never    Smokeless tobacco: Never   Substance Use Topics    Alcohol use: Never    Drug use: Never       Physical Exam   ED Triage Vitals [08/05/24 1131]   Temperature Heart Rate Respirations BP   36.6 °C (97.9 °F) 93 16 127/58      Pulse Ox Temp src Heart Rate Source Patient Position   98 % -- -- --      BP Location FiO2 (%)     -- --       Physical Exam  Constitutional:       General: She is in acute distress.      Appearance: Normal appearance. She is not ill-appearing.   HENT:      Head: Normocephalic and atraumatic.      Nose: Nose normal.   Eyes:      Extraocular Movements: Extraocular movements intact.      Conjunctiva/sclera: Conjunctivae normal.      Pupils: Pupils are equal, round, and reactive to light.   Cardiovascular:      Rate and Rhythm: Normal rate and regular rhythm.   Pulmonary:      Effort: Pulmonary effort is normal. No respiratory distress.      Breath sounds: Normal breath sounds. No stridor. No wheezing.   Musculoskeletal:         General: Tenderness (Right lateral hip, right SI joint.) present. Normal range of motion.      Cervical back: Normal range of motion.   Skin:     General: Skin is warm and dry.   Neurological:      General: No focal deficit present.      Mental Status: She is alert and oriented to person, place, and time. Mental status is at baseline.   Psychiatric:         Mood and Affect: Mood normal.           ED Course & MDM   Diagnoses as of 08/05/24 1537   Back pain of lumbosacral region with sciatica                       Danna Coma Scale Score: 15                        Medical Decision Making  A 74-year-old female patient comes in the emergency department today with complaints of severe right  hip pain.  States there is no fall or injury.  It really started to bother her.  States she did see her PCP who put her on a Medrol Dosepak which seem to be helping but when she finished the pain came back.  She rates pain 9-10 on the pain scale.  States it is hard to bear weight on the right hip.  She otherwise has no other complaints this present time for this purpose comes in the emergency department today further evaluation.    CT lumbar spine CT right hip ordered to rule out any acute fractures, dislocations, abnormalities.  P.o. Norco p.o. Zofran IM Solu-Medrol ordered for the patient's pain.    Patient had near syncopal episode after IM shot.  Patient recovered well with cool washcloth.    Patient CT lumbar spine shows some degenerative changes of the SI joints.  Some retrolisthesis at L2-3.  I believe patient might be experiencing some sciatic pain.  Patient CT of the hip is negative.  Degenerative changes.  Will discharge patient with follow-up with orthopedics.  Patient agrees with this plan expressed full verbal understanding.  All questions were answered.    Historians patient    Diagnosis: Lumbosacral pain with sciatica    Labs Reviewed - No data to display     CT lumbar spine wo IV contrast   Final Result   1. There is mild degenerative change in both sacroiliac joints.   2. There is mild retrolisthesis at L2-3. 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   3. There is moderate central canal stenosis at L2-3. The left L3-4   neural foramen is narrowed.   Signed by Dalton Aguilar MD      CT hip right wo IV contrast   Final Result   No acute fracture or malalignment.  Degenerative changes as described.   Signed by Malachi James,             Procedure  Procedures     Cory Sandoval PA-C  08/05/24 2120

## 2024-08-06 ENCOUNTER — APPOINTMENT (OUTPATIENT)
Dept: PRIMARY CARE | Facility: CLINIC | Age: 74
End: 2024-08-06
Payer: MEDICARE

## 2024-10-16 ENCOUNTER — TELEPHONE (OUTPATIENT)
Dept: PRIMARY CARE | Facility: CLINIC | Age: 74
End: 2024-10-16
Payer: MEDICARE

## 2024-10-16 DIAGNOSIS — B37.31 YEAST VAGINITIS: ICD-10-CM

## 2024-10-16 RX ORDER — FLUCONAZOLE 150 MG/1
TABLET ORAL
Qty: 2 TABLET | Refills: 0 | Status: SHIPPED | OUTPATIENT
Start: 2024-10-16

## 2024-10-16 NOTE — TELEPHONE ENCOUNTER
Jose Tobias MD  [facility] [id number] [id number] Clinical Support Staff46 minutes ago (12:17 PM)       E prescribed med to their pharmacy.  Call patient and inform.

## 2024-10-16 NOTE — TELEPHONE ENCOUNTER
Patient is calling because she has a yeast infection. She hasn't been seen since 24 and I explained to her that she would need to be seen in order for something to be called in since she hasn't been seen in awhile. Offered the walk in clinic and [facility] (she had quite a few openings today) Sofya states she has to to to a  tomorrow and she has family coming in from out of town today and she has to wait at home for them to come to the house and is unable to leave to come in for an appointment today. She wanted to know if you could call in something for her    Please advise  Thanks      Patient's # [phone]    Preferred [facility]

## 2024-11-01 DIAGNOSIS — E78.5 HYPERLIPIDEMIA, UNSPECIFIED HYPERLIPIDEMIA TYPE: ICD-10-CM

## 2024-11-01 RX ORDER — ATORVASTATIN CALCIUM 10 MG/1
TABLET, FILM COATED ORAL
Qty: 135 TABLET | Refills: 0 | Status: SHIPPED | OUTPATIENT
Start: 2024-11-01

## 2024-11-19 ENCOUNTER — TELEPHONE (OUTPATIENT)
Dept: PRIMARY CARE | Facility: CLINIC | Age: 74
End: 2024-11-19
Payer: MEDICARE

## 2024-11-19 DIAGNOSIS — L84 CALLUS OF FOOT: Primary | ICD-10-CM

## 2024-11-19 NOTE — TELEPHONE ENCOUNTER
Patient is calling because she has had a callus on her foot for about a month now and for a week now it's become pretty painful. She wanted to know if that is something you would be able to deal with or would you refer her to a podiatrist? She does have an appointment scheduled with you already on 11/25/24 for tingling in her left fingers and hand. Did you just want her to wait to address it at that visit? Or refer her to a podiatrist?    Please advise  Thanks    Patient's # [phone]

## 2024-11-19 NOTE — TELEPHONE ENCOUNTER
Jose Tobias MD  Do [id number] [id number] minutes ago (12:28 PM)       Given her situation as well as lack of equipment that we have here I would recommend seeing a podiatrist.  Please refer to 1 as soon as possible.  Thanks

## 2024-11-25 ENCOUNTER — APPOINTMENT (OUTPATIENT)
Dept: PRIMARY CARE | Facility: CLINIC | Age: 74
End: 2024-11-25
Payer: MEDICARE

## 2024-12-24 ENCOUNTER — TELEPHONE (OUTPATIENT)
Dept: PRIMARY CARE | Facility: CLINIC | Age: 74
End: 2024-12-24
Payer: COMMERCIAL

## 2024-12-24 DIAGNOSIS — B37.31 YEAST VAGINITIS: ICD-10-CM

## 2024-12-24 RX ORDER — FLUCONAZOLE 150 MG/1
TABLET ORAL
Qty: 2 TABLET | Refills: 0 | Status: SHIPPED | OUTPATIENT
Start: 2024-12-24

## 2024-12-24 NOTE — TELEPHONE ENCOUNTER
PATIENT IS CALLING - WONDERING IF YOU COULD CALL HER IN SOMETHING FOR A YEAST INFECTION TO [facility] IN [place]? PLEASE ADVISE.    LAST OV: 5/1/24  NEXT OV: 12/27/24

## 2024-12-27 ENCOUNTER — APPOINTMENT (OUTPATIENT)
Dept: PRIMARY CARE | Facility: CLINIC | Age: 74
End: 2024-12-27
Payer: COMMERCIAL

## 2024-12-30 ENCOUNTER — TELEPHONE (OUTPATIENT)
Dept: PRIMARY CARE | Facility: CLINIC | Age: 74
End: 2024-12-30
Payer: COMMERCIAL

## 2024-12-30 DIAGNOSIS — G20.A1 PARKINSON DISEASE (MULTI): ICD-10-CM

## 2024-12-30 DIAGNOSIS — E78.5 HYPERLIPIDEMIA, UNSPECIFIED HYPERLIPIDEMIA TYPE: ICD-10-CM

## 2024-12-30 DIAGNOSIS — E11.9 TYPE 2 DIABETES MELLITUS WITHOUT COMPLICATION, WITHOUT LONG-TERM CURRENT USE OF INSULIN (MULTI): ICD-10-CM

## 2024-12-30 NOTE — TELEPHONE ENCOUNTER
Called patient and went over her medications. Patient is currently taking pioglitazone, atorvastatin, vesicare, carbidopa - levodopa, Celexa and triamterene hydrochlorothiazide 9 prn for leg swelling ). Patient is unsure if she should be taking glimepiride. States that she will need refills on Celexa, atorvastatin, carbidopa - levodopa and pioglitazone.     Per Dr. Tobias: patient is to continue current medication. Needs to get A1c done so we can see where her levels are at then we can decide what medications she needs to be on.     Patient aware and will be getting her A1c done tomorrow.

## 2025-01-02 ENCOUNTER — LAB (OUTPATIENT)
Dept: LAB | Facility: LAB | Age: 75
End: 2025-01-02
Payer: COMMERCIAL

## 2025-01-02 DIAGNOSIS — I10 PRIMARY HYPERTENSION: ICD-10-CM

## 2025-01-02 DIAGNOSIS — E11.9 TYPE 2 DIABETES MELLITUS WITHOUT COMPLICATION, WITHOUT LONG-TERM CURRENT USE OF INSULIN (MULTI): ICD-10-CM

## 2025-01-02 LAB
ALBUMIN SERPL BCP-MCNC: 3.9 G/DL (ref 3.4–5)
ALP SERPL-CCNC: 113 U/L (ref 33–136)
ALT SERPL W P-5'-P-CCNC: 10 U/L (ref 7–45)
ANION GAP SERPL CALC-SCNC: 12 MMOL/L (ref 10–20)
AST SERPL W P-5'-P-CCNC: 10 U/L (ref 9–39)
BILIRUB SERPL-MCNC: 0.6 MG/DL (ref 0–1.2)
BUN SERPL-MCNC: 5 MG/DL (ref 6–23)
CALCIUM SERPL-MCNC: 8.9 MG/DL (ref 8.6–10.3)
CHLORIDE SERPL-SCNC: 101 MMOL/L (ref 98–107)
CO2 SERPL-SCNC: 28 MMOL/L (ref 21–32)
CREAT SERPL-MCNC: 0.7 MG/DL (ref 0.5–1.05)
EGFRCR SERPLBLD CKD-EPI 2021: >90 ML/MIN/1.73M*2
EST. AVERAGE GLUCOSE BLD GHB EST-MCNC: 292 MG/DL
GLUCOSE SERPL-MCNC: 315 MG/DL (ref 74–99)
HBA1C MFR BLD: 11.8 %
POTASSIUM SERPL-SCNC: 3.8 MMOL/L (ref 3.5–5.3)
PROT SERPL-MCNC: 6.8 G/DL (ref 6.4–8.2)
SODIUM SERPL-SCNC: 137 MMOL/L (ref 136–145)

## 2025-01-02 PROCEDURE — 80053 COMPREHEN METABOLIC PANEL: CPT

## 2025-01-02 PROCEDURE — 83036 HEMOGLOBIN GLYCOSYLATED A1C: CPT

## 2025-01-02 NOTE — TELEPHONE ENCOUNTER
----- Message from Jose Tobias sent at 1/2/2025  4:50 PM EST -----  Please let her know her blood sugar is extremely high with an A1c of 11.8 indicating very poor sugar control.  Please arrange for glimepiride 1 mg daily 30 pills with 1 refill.  Follow-up with me in 10 to 14 days either in the morning without eating breakfast or in the afternoon without eating lunch so we can assess how her sugar is doing.  Please let her know and arrange

## 2025-01-03 RX ORDER — GLIMEPIRIDE 1 MG/1
1 TABLET ORAL
Qty: 30 TABLET | Refills: 1 | Status: SHIPPED | OUTPATIENT
Start: 2025-01-03 | End: 2025-05-03

## 2025-01-03 RX ORDER — PIOGLITAZONEHYDROCHLORIDE 45 MG/1
45 TABLET ORAL DAILY
Qty: 30 TABLET | Refills: 3 | Status: SHIPPED | OUTPATIENT
Start: 2025-01-03

## 2025-01-03 RX ORDER — CITALOPRAM 20 MG/1
20 TABLET, FILM COATED ORAL DAILY
Qty: 90 TABLET | Refills: 1 | Status: SHIPPED | OUTPATIENT
Start: 2025-01-03

## 2025-01-03 RX ORDER — ATORVASTATIN CALCIUM 10 MG/1
TABLET, FILM COATED ORAL
Qty: 135 TABLET | Refills: 0 | Status: SHIPPED | OUTPATIENT
Start: 2025-01-03

## 2025-01-03 RX ORDER — CARBIDOPA AND LEVODOPA 25; 100 MG/1; MG/1
1 TABLET ORAL 3 TIMES DAILY
Qty: 180 TABLET | Refills: 1 | Status: SHIPPED | OUTPATIENT
Start: 2025-01-03

## 2025-01-03 NOTE — TELEPHONE ENCOUNTER
Pt is aware and med is pended. Pt is scheduled. Pt also said that hospital was terrible. She would like to not go there again.

## 2025-01-03 NOTE — TELEPHONE ENCOUNTER
Jose Tobias MD  Do [id number] [place] [id number] Clinical Support Staff33 minutes ago (9:13 AM)       E prescribed med to their pharmacy.  Call patient and inform.       [employer] message sent

## 2025-01-14 ENCOUNTER — APPOINTMENT (OUTPATIENT)
Dept: PRIMARY CARE | Facility: CLINIC | Age: 75
End: 2025-01-14
Payer: COMMERCIAL

## 2025-02-07 ENCOUNTER — TELEPHONE (OUTPATIENT)
Dept: PRIMARY CARE | Facility: CLINIC | Age: 75
End: 2025-02-07
Payer: COMMERCIAL

## 2025-02-07 DIAGNOSIS — E11.9 TYPE 2 DIABETES MELLITUS WITHOUT COMPLICATION, WITHOUT LONG-TERM CURRENT USE OF INSULIN (MULTI): ICD-10-CM

## 2025-02-07 NOTE — TELEPHONE ENCOUNTER
Please call and assist patient in scheduling an appointment for further refills glimepiride 1 mg. If patient needs a short term supply, please let us know.

## 2025-05-10 DIAGNOSIS — G20.A1 PARKINSON DISEASE (MULTI): ICD-10-CM

## 2025-05-12 RX ORDER — CARBIDOPA AND LEVODOPA 25; 100 MG/1; MG/1
1 TABLET ORAL 3 TIMES DAILY
Qty: 90 TABLET | Refills: 0 | Status: SHIPPED | OUTPATIENT
Start: 2025-05-12

## 2025-05-13 ENCOUNTER — OFFICE VISIT (OUTPATIENT)
Dept: PRIMARY CARE | Facility: CLINIC | Age: 75
End: 2025-05-13
Payer: MEDICARE

## 2025-05-13 VITALS
HEIGHT: 66 IN | SYSTOLIC BLOOD PRESSURE: 124 MMHG | HEART RATE: 87 BPM | TEMPERATURE: 97.8 F | DIASTOLIC BLOOD PRESSURE: 76 MMHG | BODY MASS INDEX: 43.71 KG/M2 | WEIGHT: 272 LBS | OXYGEN SATURATION: 99 %

## 2025-05-13 DIAGNOSIS — E78.5 HYPERLIPIDEMIA, UNSPECIFIED HYPERLIPIDEMIA TYPE: ICD-10-CM

## 2025-05-13 DIAGNOSIS — I10 PRIMARY HYPERTENSION: ICD-10-CM

## 2025-05-13 DIAGNOSIS — E66.01 OBESITY, MORBID (MULTI): ICD-10-CM

## 2025-05-13 DIAGNOSIS — E11.40 TYPE 2 DIABETES MELLITUS WITH DIABETIC NEUROPATHY, UNSPECIFIED WHETHER LONG TERM INSULIN USE (MULTI): ICD-10-CM

## 2025-05-13 DIAGNOSIS — M25.559 HIP PAIN, UNSPECIFIED LATERALITY: ICD-10-CM

## 2025-05-13 DIAGNOSIS — M77.9 TENDONITIS: Primary | ICD-10-CM

## 2025-05-13 DIAGNOSIS — E11.9 TYPE 2 DIABETES MELLITUS WITHOUT COMPLICATION, WITHOUT LONG-TERM CURRENT USE OF INSULIN: ICD-10-CM

## 2025-05-13 PROCEDURE — 1159F MED LIST DOCD IN RCRD: CPT | Performed by: FAMILY MEDICINE

## 2025-05-13 PROCEDURE — 99213 OFFICE O/P EST LOW 20 MIN: CPT | Performed by: FAMILY MEDICINE

## 2025-05-13 PROCEDURE — 1124F ACP DISCUSS-NO DSCNMKR DOCD: CPT | Performed by: FAMILY MEDICINE

## 2025-05-13 PROCEDURE — 3074F SYST BP LT 130 MM HG: CPT | Performed by: FAMILY MEDICINE

## 2025-05-13 PROCEDURE — 3046F HEMOGLOBIN A1C LEVEL >9.0%: CPT | Performed by: FAMILY MEDICINE

## 2025-05-13 PROCEDURE — G2211 COMPLEX E/M VISIT ADD ON: HCPCS | Performed by: FAMILY MEDICINE

## 2025-05-13 PROCEDURE — 3078F DIAST BP <80 MM HG: CPT | Performed by: FAMILY MEDICINE

## 2025-05-13 RX ORDER — METHYLPREDNISOLONE 4 MG/1
TABLET ORAL
Qty: 21 TABLET | Refills: 0 | Status: SHIPPED | OUTPATIENT
Start: 2025-05-13

## 2025-05-13 NOTE — PROGRESS NOTES
"Subjective   Patient ID: Sofya Dunaway is a 75 y.o. female who presents for Leg Pain, Hypertension, Hyperlipidemia, and Diabetes.  HPI  Patient presents in the office today with concerns of leg pain with issues walking . Pt states she thinks she may have pulled a muscle located on her upper RT thigh. Pt denies any falls Ongoing 3 weeks. Tried massaging, cbd gel and advil.       Taking current medications which were reviewed.  Problem list discussed.    Eating okay.    Has no other new problem /question.  ROS  Constitutional-  No appetite change.  Eyes- Denies vision changes.  Respiratory- No shortness of breath.  Cardiovascular- No palpitations. No chest pain.  GI- No nausea or vomiting. No diarrhea or constipation. Denies abdominal pain.  Musculoskeletal- Denies joint swelling.  Neurological- Denies headaches. Denies dizziness.  Skin- No rashes.  Psychiatric/Behavioral- Denies significant anxiety, or depressed mood.     Objective     /76   Pulse 87   Temp 36.6 °C (97.8 °F) (Temporal)   Ht 1.676 m (5' 6\")   Wt 123 kg (272 lb)   SpO2 99%   BMI 43.90 kg/m²     Allergies[1]    Constitutional-- Well-nourished.  No distress  Head- unremarkable.  Eyes- PERRL.  Conjunctiva normal.  Nose- Normal.  No rhinorrhea noted.  Throat- Oropharynx is clear and moist.  Neck- Supple with no thyromegaly.  No significant cervical adenopathy noted.  Pulmonary/Chest- Breath sounds normal with normal effort.  No wheezing.  Heart- Regular rate and rhythm.  No murmur.  Abdomen- Soft and non-tender.  No masses noted.  Musculoskeletal-lateral thigh pain consistent with tendinitis/bursitis on that side.  Extremities-mild leg swelling to above the ankles chronic  Neurological- Alert.  No noted deficits.  Skin- Warm.  No rashes.  Psychiatric/Behavioral- Mood and affect normal.  Behavior normal.     Assessment/Plan   1. Tendonitis  methylPREDNISolone (Medrol Dospak) 4 mg tablets      2. Hip pain, unspecified laterality  " methylPREDNISolone (Medrol Dospak) 4 mg tablets      3. Type 2 diabetes mellitus without complication, without long-term current use of insulin  Hemoglobin A1C    Comprehensive metabolic panel    Albumin-Creatinine Ratio, Urine Random    Hemoglobin A1C    Comprehensive metabolic panel    Albumin-Creatinine Ratio, Urine Random      4. Hyperlipidemia, unspecified hyperlipidemia type        5. Primary hypertension  Comprehensive metabolic panel    Comprehensive metabolic panel      6. Type 2 diabetes mellitus with diabetic neuropathy, unspecified whether long term insulin use (Multi)        7. Obesity, morbid (Multi)        8. Body mass index (BMI) 45.0-49.9, adult (Multi)               Long talk. Treatment options reviewed.    Reviewed most recent lab work with patient. Advised patient to remain up to date on routine maintenance and health screening.  Maintain appointments with specialists as scheduled.      Discussed tendonitis and related symptoms. Take Methylprednisolone as discussed. Continue to monitor.     Discussed Diabetes Mellitus. Continue to monitor glucose levels. Educated on diabetes, low-calorie diet and exercise. Recommended eye exam once a year. Educated on diabetic foot care.    Blood pressure controlled    Discussed importance of natural sources of nutrition.  Advised patient to consume vegetables, salads, fruits, nuts, and proteins such as fish and chicken.  Discussed portion control.      Discussed the importance of routine stretching and exercise.     Continue and take your medications as prescribed.    Health Maintenance issues discussed.    Importance of healthy diet and regular exercise regimen discussed.    We will contact you with any test results ordered. If you do not hear from us, please contact.    Follow-up as instructed or sooner if any problems or symptoms do not resolve as expected.            Scribe Attestation  By signing my name below, Tania GARCIA Scribe   attest that this  documentation has been prepared under the direction and in the presence of Jose Tobias MD.         [1]   Allergies  Allergen Reactions    Itraconazole Rash

## 2025-05-14 ENCOUNTER — HOSPITAL ENCOUNTER (EMERGENCY)
Facility: HOSPITAL | Age: 75
Discharge: HOME | End: 2025-05-14
Payer: MEDICARE

## 2025-05-14 ENCOUNTER — APPOINTMENT (OUTPATIENT)
Dept: RADIOLOGY | Facility: HOSPITAL | Age: 75
End: 2025-05-14
Payer: MEDICARE

## 2025-05-14 VITALS
WEIGHT: 272 LBS | OXYGEN SATURATION: 97 % | TEMPERATURE: 98.6 F | SYSTOLIC BLOOD PRESSURE: 112 MMHG | DIASTOLIC BLOOD PRESSURE: 53 MMHG | HEART RATE: 65 BPM | HEIGHT: 66 IN | BODY MASS INDEX: 43.71 KG/M2 | RESPIRATION RATE: 18 BRPM

## 2025-05-14 DIAGNOSIS — M46.1 OSTEOARTHRITIS OF RIGHT SACROILIAC JOINT: Primary | ICD-10-CM

## 2025-05-14 PROCEDURE — 96372 THER/PROPH/DIAG INJ SC/IM: CPT | Performed by: NURSE PRACTITIONER

## 2025-05-14 PROCEDURE — 73700 CT LOWER EXTREMITY W/O DYE: CPT | Mod: RT

## 2025-05-14 PROCEDURE — 73700 CT LOWER EXTREMITY W/O DYE: CPT | Mod: RIGHT SIDE | Performed by: RADIOLOGY

## 2025-05-14 PROCEDURE — 2500000004 HC RX 250 GENERAL PHARMACY W/ HCPCS (ALT 636 FOR OP/ED): Performed by: NURSE PRACTITIONER

## 2025-05-14 PROCEDURE — 99284 EMERGENCY DEPT VISIT MOD MDM: CPT | Mod: 25

## 2025-05-14 RX ORDER — PREDNISONE 20 MG/1
40 TABLET ORAL ONCE
Status: COMPLETED | OUTPATIENT
Start: 2025-05-14 | End: 2025-05-14

## 2025-05-14 RX ORDER — KETOROLAC TROMETHAMINE 30 MG/ML
15 INJECTION, SOLUTION INTRAMUSCULAR; INTRAVENOUS ONCE
Status: COMPLETED | OUTPATIENT
Start: 2025-05-14 | End: 2025-05-14

## 2025-05-14 RX ADMIN — PREDNISONE 40 MG: 20 TABLET ORAL at 04:41

## 2025-05-14 RX ADMIN — KETOROLAC TROMETHAMINE 15 MG: 30 INJECTION, SOLUTION INTRAMUSCULAR at 02:39

## 2025-05-14 ASSESSMENT — LIFESTYLE VARIABLES
HAVE YOU EVER FELT YOU SHOULD CUT DOWN ON YOUR DRINKING: NO
HAVE PEOPLE ANNOYED YOU BY CRITICIZING YOUR DRINKING: NO
EVER HAD A DRINK FIRST THING IN THE MORNING TO STEADY YOUR NERVES TO GET RID OF A HANGOVER: NO
EVER FELT BAD OR GUILTY ABOUT YOUR DRINKING: NO
TOTAL SCORE: 0

## 2025-05-14 ASSESSMENT — PAIN - FUNCTIONAL ASSESSMENT
PAIN_FUNCTIONAL_ASSESSMENT: 0-10
PAIN_FUNCTIONAL_ASSESSMENT: 0-10

## 2025-05-14 ASSESSMENT — PAIN SCALES - GENERAL
PAINLEVEL_OUTOF10: 2
PAINLEVEL_OUTOF10: 9

## 2025-05-14 ASSESSMENT — PAIN DESCRIPTION - ORIENTATION: ORIENTATION: RIGHT

## 2025-05-14 ASSESSMENT — COLUMBIA-SUICIDE SEVERITY RATING SCALE - C-SSRS
1. IN THE PAST MONTH, HAVE YOU WISHED YOU WERE DEAD OR WISHED YOU COULD GO TO SLEEP AND NOT WAKE UP?: NO
2. HAVE YOU ACTUALLY HAD ANY THOUGHTS OF KILLING YOURSELF?: NO
6. HAVE YOU EVER DONE ANYTHING, STARTED TO DO ANYTHING, OR PREPARED TO DO ANYTHING TO END YOUR LIFE?: NO

## 2025-05-14 ASSESSMENT — PAIN DESCRIPTION - LOCATION
LOCATION: HIP
LOCATION: LEG

## 2025-05-14 NOTE — ED PROVIDER NOTES
HPI   Chief Complaint   Patient presents with    Leg Pain     Right leg pain x 1 week; no recent fall; patient is able to ambulate on it        75-year-old female presents emergency department, patient states pain in the right hip area for the last week or so.  Did see her doctor today who prescribed her a course of steroids.  States she went to the pharmacy to pick it up, waited over 2 hours and it was not ready so she went home.  Patient states at home overnight tonight she has been having worse hip pain, unable to sleep because of it.    Denies any falls or injuries, no numbness or tingling distal extremity.  Describes pain isolated to the lateral aspect of the right hip.      History provided by:  Patient   used: No            Patient History   Medical History[1]  Surgical History[2]  Family History[3]  Social History[4]    Physical Exam   ED Triage Vitals [05/14/25 0212]   Temperature Heart Rate Respirations BP   37 °C (98.6 °F) 70 18 145/61      Pulse Ox Temp Source Heart Rate Source Patient Position   96 % Temporal -- --      BP Location FiO2 (%)     -- --       Physical Exam  Constitutional: Vitals noted, no distress. Afebrile.   Cardiovascular: Regular, rate, rhythm, no murmur.   Pulmonary: Lungs clear bilaterally with good aeration. No adventitious breath sounds.   Gastrointestinal: Soft, nonsurgical. Nontender. No peritoneal signs. Normoactive bowel sounds.   Musculoskeletal: Tenderness lateral aspect of the right hip.  Neurovascular intact distally, easily palpable DP pulse.  Skin: No rash.   Neuro: No focal neurologic deficits, NIH score of 0.      ED Course & MDM   Diagnoses as of 05/14/25 9506   Osteoarthritis of right sacroiliac joint        Labs Reviewed - No data to display     CT hip right wo IV contrast   Final Result   Normal right hip CT scan.   Osteoarthritis findings at the right sacroiliac joint, and at the   pubic symphysis.   IUD appropriately positioned in the  endometrial cavity.   Signed by Malachi Sevilla MD                 No data recorded                         Medical Decision Making  Patient medicated with 15 mg of IM Toradol for her pain.  I did obtain CT imaging of her right hip, she is larger in size and x-ray imaging may not have been definitive.    Ultimately CT scan shows osteoarthritis of the right sacroiliac joint, otherwise unremarkable.    Discussed result with the patient, discussed that the steroid course her doctor prescribed would likely help with her pain.  Offered her a dose here prior to discharge home, though I she will pick it up at the pharmacy today.  Discussed follow-up in 1 week with her doctor.    Procedure  Procedures       [1]   Past Medical History:  Diagnosis Date    Body mass index (BMI) 50.0-59.9, adult (Multi) 10/05/2021    BMI 50.0-59.9, adult    Body mass index (BMI) 50.0-59.9, adult (Multi) 10/05/2021    BMI 50.0-59.9, adult    Disorder of the skin and subcutaneous tissue, unspecified 08/19/2019    Skin lesion of neck    Encounter for general adult medical examination without abnormal findings 01/29/2021    Encounter for Medicare annual wellness exam    Encounter for immunization     Encounter for immunization    Encounter for screening for malignant neoplasm of colon 02/04/2021    Screen for colon cancer    Encounter for screening mammogram for malignant neoplasm of breast     Other screening mammogram    Morbid (severe) obesity due to excess calories (Multi) 10/05/2021    Class 3 severe obesity due to excess calories with serious comorbidity and body mass index (BMI) of 50.0 to 59.9 in adult    Morbid (severe) obesity due to excess calories (Multi) 10/05/2021    Class 3 severe obesity due to excess calories with serious comorbidity and body mass index (BMI) of 50.0 to 59.9 in adult    Personal history of other diseases of the respiratory system 01/29/2020    History of upper respiratory infection    Personal history of other drug  therapy 01/29/2021    History of influenza vaccination    Personal history of other medical treatment 01/29/2021    History of screening mammography   [2] No past surgical history on file.  [3]   Family History  Problem Relation Name Age of Onset    Cancer Mother     [4]   Social History  Tobacco Use    Smoking status: Never    Smokeless tobacco: Never   Substance Use Topics    Alcohol use: Never    Drug use: Never        SABRINA Evans-LILLIAN  05/14/25 0436

## 2025-05-18 PROBLEM — E11.40 TYPE 2 DIABETES MELLITUS WITH DIABETIC NEUROPATHY, UNSPECIFIED WHETHER LONG TERM INSULIN USE (MULTI): Status: ACTIVE | Noted: 2025-05-18

## 2025-06-13 ENCOUNTER — TELEPHONE (OUTPATIENT)
Dept: PRIMARY CARE | Facility: CLINIC | Age: 75
End: 2025-06-13
Payer: MEDICARE

## 2025-06-13 NOTE — TELEPHONE ENCOUNTER
[facility] called stating that the care plan assessment is ready to view in their portal, or can [phone]

## 2025-06-19 ENCOUNTER — TELEPHONE (OUTPATIENT)
Dept: PRIMARY CARE | Facility: CLINIC | Age: 75
End: 2025-06-19
Payer: MEDICARE

## 2025-06-19 DIAGNOSIS — M79.89 LEG SWELLING: Primary | ICD-10-CM

## 2025-06-19 RX ORDER — FUROSEMIDE 20 MG/1
20 TABLET ORAL DAILY
Qty: 30 TABLET | Refills: 1 | Status: SHIPPED | OUTPATIENT
Start: 2025-06-19 | End: 2025-08-18

## 2025-06-19 NOTE — TELEPHONE ENCOUNTER
Please let her know I sent in a better diuretic for her leg swelling called furosemide. She should take 1 every day preferably in the morning. If she is taking the original diuretic triamterene-hydrochlorothiazide have her stop as furosemide is better. Continue to keep legs elevated. Have her also buy some over-the-counter potassium and take 1 every day. Also open up one of my afternoon spots this Wednesday so I can see her for a follow-up.     She has an appointment 6/26/25 with Dr Tobias, that is ok to keep that appointment instead of opening up Wednesday appointment per Dr Tobias       Mailbox is full. Could not leave a message. [phone]

## 2025-06-19 NOTE — TELEPHONE ENCOUNTER
PATIENT CALLING, SHE STATES THAT HER ANKLES AND FEET ARE SWOLLEN AND PAINFUL TO WALK. SHE STATES THAT SHE HAS BEEN DRINKING A LOT OF WATER AND IS TRYING TO ELEVATE HER LEGS as MUCH as SHE CAN.    SHE IS WANTING TO KNOW IF THERE IS ANYTHING THAT YOU CAN GIVE HER OR RECOMMEND.    PLEASE ADVISE.

## 2025-06-26 ENCOUNTER — APPOINTMENT (OUTPATIENT)
Dept: PRIMARY CARE | Facility: CLINIC | Age: 75
End: 2025-06-26
Payer: MEDICARE

## 2025-07-11 ENCOUNTER — APPOINTMENT (OUTPATIENT)
Dept: PRIMARY CARE | Facility: CLINIC | Age: 75
End: 2025-07-11
Payer: MEDICARE

## 2025-07-11 ENCOUNTER — TELEPHONE (OUTPATIENT)
Dept: PRIMARY CARE | Facility: CLINIC | Age: 75
End: 2025-07-11

## 2025-07-11 DIAGNOSIS — M79.604 CHRONIC PAIN OF RIGHT LOWER EXTREMITY: Primary | ICD-10-CM

## 2025-07-11 DIAGNOSIS — G89.29 CHRONIC PAIN OF RIGHT LOWER EXTREMITY: Primary | ICD-10-CM

## 2025-07-11 DIAGNOSIS — B02.29 POSTHERPETIC NEURALGIA: ICD-10-CM

## 2025-07-11 NOTE — TELEPHONE ENCOUNTER
PT called in stating that a friend of hers recommended lidocaine patches for her pain in her right leg she has been having for a while.     LOV 5/13/25  Upcoming 7/23/25      [facility] in [place] [facility]    Please advise

## 2025-07-13 ENCOUNTER — TELEPHONE (OUTPATIENT)
Dept: PRIMARY CARE | Facility: CLINIC | Age: 75
End: 2025-07-13
Payer: MEDICARE

## 2025-07-13 NOTE — TELEPHONE ENCOUNTER
"Received call from patient earlier today at 8:18 AM.  Returned call to [phone] but there was no answer and voicemail not able to be left as the message state the \"mailbox is full.\"    "

## 2025-07-14 ENCOUNTER — APPOINTMENT (OUTPATIENT)
Dept: PRIMARY CARE | Facility: CLINIC | Age: 75
End: 2025-07-14
Payer: MEDICARE

## 2025-07-14 RX ORDER — LIDOCAINE 50 MG/G
1 PATCH TOPICAL DAILY
Qty: 30 PATCH | Refills: 1 | Status: SHIPPED | OUTPATIENT
Start: 2025-07-14 | End: 2026-07-14

## 2025-07-14 NOTE — TELEPHONE ENCOUNTER
Jose Tobias MD to [facility] [id number] Clerical (Selected Message)        7/14/25  1:01 PM  Please let her know I ordered the lidocaine patches to go on in the morning and off in the evening.  Or she can put them on at night and take it off after 12 hours.  Please let her know it looks like her insurance will require a prior authorization.  Hopefully will be covered.  If not it does come over-the-counter and she could try that version as well.  Please let her know

## 2025-07-17 NOTE — TELEPHONE ENCOUNTER
Patient also wanted us to let you know that she is having problems with her feet swelling - right one is worse than left - has been putting her feet in cold water, but that isn't working.  Wondering what you could recommend.  Please advise    PATIENT AWARE THAT YOU ARE OUT OF THE OFFICE AND WANTED MESSAGE LEFT FOR WHEN YOU RETURN.        Spoke with patient - she is aware of Dr. Tobias's message and instructions.

## 2025-07-17 NOTE — TELEPHONE ENCOUNTER
Please let her know swelling is typically worse in the summertime heat. I know she is taking a diuretic furosemide. Okay to take 1 in the morning and 1 at lunchtime but for no more than 2 days. If does not make a big difference then she should see me next week or our nurse practitioner [facility]. Please let her know       Tried to call patient with above message but mailbox was full, could not leave a message  [phone]  Will try back tomorrow morning.

## 2025-07-23 ENCOUNTER — APPOINTMENT (OUTPATIENT)
Dept: PRIMARY CARE | Facility: CLINIC | Age: 75
End: 2025-07-23
Payer: MEDICARE

## 2025-07-23 VITALS
OXYGEN SATURATION: 99 % | TEMPERATURE: 97.1 F | SYSTOLIC BLOOD PRESSURE: 124 MMHG | WEIGHT: 273.8 LBS | BODY MASS INDEX: 44 KG/M2 | HEIGHT: 66 IN | DIASTOLIC BLOOD PRESSURE: 76 MMHG | HEART RATE: 67 BPM

## 2025-07-23 DIAGNOSIS — G89.29 CHRONIC PAIN OF RIGHT LOWER EXTREMITY: ICD-10-CM

## 2025-07-23 DIAGNOSIS — M77.9 TENDONITIS: ICD-10-CM

## 2025-07-23 DIAGNOSIS — E11.9 TYPE 2 DIABETES MELLITUS WITHOUT COMPLICATION, WITHOUT LONG-TERM CURRENT USE OF INSULIN: ICD-10-CM

## 2025-07-23 DIAGNOSIS — E78.5 HYPERLIPIDEMIA, UNSPECIFIED HYPERLIPIDEMIA TYPE: ICD-10-CM

## 2025-07-23 DIAGNOSIS — M79.604 CHRONIC PAIN OF RIGHT LOWER EXTREMITY: ICD-10-CM

## 2025-07-23 DIAGNOSIS — M15.9 OSTEOARTHRITIS OF MULTIPLE JOINTS, UNSPECIFIED OSTEOARTHRITIS TYPE: ICD-10-CM

## 2025-07-23 DIAGNOSIS — G20.A1 PARKINSON'S DISEASE, UNSPECIFIED WHETHER DYSKINESIA PRESENT, UNSPECIFIED WHETHER MANIFESTATIONS FLUCTUATE: ICD-10-CM

## 2025-07-23 DIAGNOSIS — Z00.00 MEDICARE ANNUAL WELLNESS VISIT, SUBSEQUENT: Primary | ICD-10-CM

## 2025-07-23 DIAGNOSIS — E66.813 CLASS 3 DRUG-INDUCED OBESITY WITHOUT SERIOUS COMORBIDITY WITH BODY MASS INDEX (BMI) OF 40.0 TO 44.9 IN ADULT: ICD-10-CM

## 2025-07-23 DIAGNOSIS — E66.1 CLASS 3 DRUG-INDUCED OBESITY WITHOUT SERIOUS COMORBIDITY WITH BODY MASS INDEX (BMI) OF 40.0 TO 44.9 IN ADULT: ICD-10-CM

## 2025-07-23 DIAGNOSIS — M25.559 HIP PAIN, UNSPECIFIED LATERALITY: ICD-10-CM

## 2025-07-23 DIAGNOSIS — I10 PRIMARY HYPERTENSION: ICD-10-CM

## 2025-07-23 PROCEDURE — 3078F DIAST BP <80 MM HG: CPT | Performed by: FAMILY MEDICINE

## 2025-07-23 PROCEDURE — 1036F TOBACCO NON-USER: CPT | Performed by: FAMILY MEDICINE

## 2025-07-23 PROCEDURE — G0439 PPPS, SUBSEQ VISIT: HCPCS | Performed by: FAMILY MEDICINE

## 2025-07-23 PROCEDURE — 1159F MED LIST DOCD IN RCRD: CPT | Performed by: FAMILY MEDICINE

## 2025-07-23 PROCEDURE — 3074F SYST BP LT 130 MM HG: CPT | Performed by: FAMILY MEDICINE

## 2025-07-23 PROCEDURE — 1170F FXNL STATUS ASSESSED: CPT | Performed by: FAMILY MEDICINE

## 2025-07-23 PROCEDURE — 99213 OFFICE O/P EST LOW 20 MIN: CPT | Performed by: FAMILY MEDICINE

## 2025-07-23 RX ORDER — METHYLPREDNISOLONE 4 MG/1
TABLET ORAL
Qty: 21 TABLET | Refills: 0 | Status: SHIPPED | OUTPATIENT
Start: 2025-07-23

## 2025-07-23 ASSESSMENT — ENCOUNTER SYMPTOMS
LOSS OF SENSATION IN FEET: 0
OCCASIONAL FEELINGS OF UNSTEADINESS: 1
DEPRESSION: 0

## 2025-07-23 ASSESSMENT — ACTIVITIES OF DAILY LIVING (ADL)
MANAGING_FINANCES: INDEPENDENT
DOING_HOUSEWORK: INDEPENDENT
GROCERY_SHOPPING: NEEDS ASSISTANCE
DRESSING: INDEPENDENT
BATHING: INDEPENDENT
TAKING_MEDICATION: INDEPENDENT

## 2025-07-23 NOTE — PROGRESS NOTES
"Subjective   Patient ID: Sofya Dunaway is a 75 y.o. female who presents for Medicare Annual Wellness, Follow-up, Foot Swelling, and Leg Swelling.  HPI    Medicare Annual Wellness  Eats a generally healthy diet   Exercises   Denies any chest pain,SOB  No Abdominal pain   No black or bloody stools   Urination/BM normal   Last eye apt about a year  Last dental apt within last couple months  No new family h/o cancers or heart disease    ER follow up   Seen at [facility]  Seen on 5/14/25  Seen for leg pain  2 months since discharge  Patient had CT right hip   Patient advised to follow up with PCP  Patient was prescribed nothing  Patient states that it felt like she was paralyzed, she couldn't move her leg without screaming in pain. Patient is still having pain but its not as bad it was.     Patient is experiencing swelling in ankles and feet. Ongoing x 1 week. Patient has been soaking in cold water but the swelling isnt going down. Patient states elevating her legs doesn't help the swelling either.       Taking current medications which were reviewed.  Problem list discussed.    Overall doing well.  Eating okay.  Staying active.    Has no other new problem /question.     ROS  Constitutional- No activity change. No appetite change.  Eyes- Denies vision changes.  Respiratory- No shortness of breath.  Cardiovascular- No palpitations. No chest pain.  GI- No nausea or vomiting. No diarrhea or constipation. Denies abdominal pain.  Musculoskeletal- Denies joint swelling.  Neurological- Denies headaches. Denies dizziness.  Skin- No rashes.  Psychiatric/Behavioral- Denies significant anxiety, or depressed mood.     Objective     /76 (BP Location: Right arm, Patient Position: Sitting, BP Cuff Size: Adult)   Pulse 67   Temp 36.2 °C (97.1 °F) (Temporal)   Ht 1.676 m (5' 6\")   Wt 124 kg (273 lb 12.8 oz)   SpO2 99%   BMI 44.19 kg/m²     Allergies[1]    Constitutional-- Well-nourished.  No distress  Head- " unremarkable.  Eyes- PERRL.  Conjunctiva normal.  Nose- Normal.  No rhinorrhea noted.  Throat- Oropharynx is clear and moist.  Neck- Supple with no thyromegaly.  No significant cervical adenopathy noted.  Pulmonary/Chest- Breath sounds normal with normal effort.  No wheezing.  Heart- Regular rate and rhythm.  No murmur.  Abdomen- Soft and non-tender.  No masses noted.  Musculoskeletal-OA changes hands and knees noted.  Chronic low back pain exacerbated with range of motion.  Extremities-1+ edema to the knees chronic  Neurological- Alert.  No noted deficits.  Skin- Warm.  No rashes.  Psychiatric/Behavioral- Mood and affect normal.  Behavior normal.     Assessment/Plan   1. Medicare annual wellness visit, subsequent        2. Type 2 diabetes mellitus without complication, without long-term current use of insulin  Hemoglobin A1C    Albumin-Creatinine Ratio, Urine Random    Hemoglobin A1C    Albumin-Creatinine Ratio, Urine Random      3. Hyperlipidemia, unspecified hyperlipidemia type        4. Primary hypertension  CBC and Auto Differential    Comprehensive Metabolic Panel    CBC and Auto Differential    Comprehensive Metabolic Panel      5. BMI 40.0-44.9, adult (Multi)        6. Class 3 drug-induced obesity without serious comorbidity with body mass index (BMI) of 40.0 to 44.9 in adult        7. Chronic pain of right lower extremity        8. Parkinson's disease, unspecified whether dyskinesia present, unspecified whether manifestations fluctuate        9. Osteoarthritis of multiple joints, unspecified osteoarthritis type        10. Hip pain, unspecified laterality  methylPREDNISolone (Medrol Dospak) 4 mg tablets      11. Tendonitis  methylPREDNISolone (Medrol Dospak) 4 mg tablets             Long talk. Treatment options reviewed.    Medicare wellness questionnaire reviewed in detail. Advanced Directives reviewed today. Patient advised to provide the office with a copy if has not already done so. No problems with  activities of daily living.  Falls risks reviewed.      Recent test results reviewed along with past radiology reports.    Overall her back pain and arthritis symptoms are better.  Talk about having a Medrol Dosepak to have on hand should he get bad again as she responded well to it    Parkinson's disease stable  Hypertension controlled  Diabetes stable  Continue and take your medications as prescribed.    Health Maintenance issues discussed.    Importance of healthy diet and regular exercise regimen discussed.    We will contact you with any test results ordered. If you do not hear from us, please contact.    Follow-up as instructed or sooner if any problems or symptoms do not resolve as expected.    All medical record entries made by the Scribe were at my direction and personally dictated by me.    I have reviewed the chart and agree that the record accurately reflects my personal performance of the history, physical exam, discussion, and plan.         [1]   Allergies  Allergen Reactions    Itraconazole Rash

## 2025-07-24 LAB
ALBUMIN SERPL-MCNC: 3.8 G/DL (ref 3.6–5.1)
ALBUMIN/CREAT UR: 6 MG/G CREAT
ALP SERPL-CCNC: 120 U/L (ref 37–153)
ALT SERPL-CCNC: 14 U/L (ref 6–29)
ANION GAP SERPL CALCULATED.4IONS-SCNC: 11 MMOL/L (CALC) (ref 7–17)
AST SERPL-CCNC: 14 U/L (ref 10–35)
BASOPHILS # BLD AUTO: 47 CELLS/UL (ref 0–200)
BASOPHILS NFR BLD AUTO: 0.5 %
BILIRUB SERPL-MCNC: 0.7 MG/DL (ref 0.2–1.2)
BUN SERPL-MCNC: 10 MG/DL (ref 7–25)
CALCIUM SERPL-MCNC: 9.1 MG/DL (ref 8.6–10.4)
CHLORIDE SERPL-SCNC: 98 MMOL/L (ref 98–110)
CO2 SERPL-SCNC: 27 MMOL/L (ref 20–32)
CREAT SERPL-MCNC: 0.75 MG/DL (ref 0.6–1)
CREAT UR-MCNC: 171 MG/DL (ref 20–275)
EGFRCR SERPLBLD CKD-EPI 2021: 83 ML/MIN/1.73M2
EOSINOPHIL # BLD AUTO: 84 CELLS/UL (ref 15–500)
EOSINOPHIL NFR BLD AUTO: 0.9 %
ERYTHROCYTE [DISTWIDTH] IN BLOOD BY AUTOMATED COUNT: 13.8 % (ref 11–15)
EST. AVERAGE GLUCOSE BLD GHB EST-MCNC: 235 MG/DL
EST. AVERAGE GLUCOSE BLD GHB EST-SCNC: 13 MMOL/L
GLUCOSE SERPL-MCNC: 291 MG/DL (ref 65–139)
HBA1C MFR BLD: 9.8 %
HCT VFR BLD AUTO: 44.1 % (ref 35–45)
HGB BLD-MCNC: 13.9 G/DL (ref 11.7–15.5)
LYMPHOCYTES # BLD AUTO: 2874 CELLS/UL (ref 850–3900)
LYMPHOCYTES NFR BLD AUTO: 30.9 %
MCH RBC QN AUTO: 30.2 PG (ref 27–33)
MCHC RBC AUTO-ENTMCNC: 31.5 G/DL (ref 32–36)
MCV RBC AUTO: 95.7 FL (ref 80–100)
MICROALBUMIN UR-MCNC: 1 MG/DL
MONOCYTES # BLD AUTO: 493 CELLS/UL (ref 200–950)
MONOCYTES NFR BLD AUTO: 5.3 %
NEUTROPHILS # BLD AUTO: 5803 CELLS/UL (ref 1500–7800)
NEUTROPHILS NFR BLD AUTO: 62.4 %
PLATELET # BLD AUTO: 274 THOUSAND/UL (ref 140–400)
PMV BLD REES-ECKER: 10.9 FL (ref 7.5–12.5)
POTASSIUM SERPL-SCNC: 4.1 MMOL/L (ref 3.5–5.3)
PROT SERPL-MCNC: 6.9 G/DL (ref 6.1–8.1)
RBC # BLD AUTO: 4.61 MILLION/UL (ref 3.8–5.1)
SODIUM SERPL-SCNC: 136 MMOL/L (ref 135–146)
WBC # BLD AUTO: 9.3 THOUSAND/UL (ref 3.8–10.8)

## 2025-07-25 ENCOUNTER — RESULTS FOLLOW-UP (OUTPATIENT)
Dept: PRIMARY CARE | Facility: CLINIC | Age: 75
End: 2025-07-25
Payer: MEDICARE

## 2025-07-25 RX ORDER — GLIMEPIRIDE 2 MG/1
2 TABLET ORAL
Qty: 30 TABLET | Refills: 2 | Status: SHIPPED | OUTPATIENT
Start: 2025-07-25 | End: 2025-10-23

## 2025-07-28 NOTE — TELEPHONE ENCOUNTER
Labs are within normal limits or stable except for very high blood sugar. A1c is 9.8 indicating very poor sugar control. Need to increase glimepiride to 2 mg daily. I sent a new prescription into [facility] for her however she can continue to use her 1 mg tablets but should take 2 of them every morning. This should hopefully make a big difference combined with improving low sugar diet. Have her keep her appointment with me in October. Please let her know         Called patient and she is aware

## 2025-08-11 ENCOUNTER — TELEPHONE (OUTPATIENT)
Dept: PRIMARY CARE | Facility: CLINIC | Age: 75
End: 2025-08-11
Payer: MEDICARE

## 2025-08-11 DIAGNOSIS — G20.A1 PARKINSON'S DISEASE, UNSPECIFIED WHETHER DYSKINESIA PRESENT, UNSPECIFIED WHETHER MANIFESTATIONS FLUCTUATE: ICD-10-CM

## 2025-08-11 DIAGNOSIS — E11.9 TYPE 2 DIABETES MELLITUS WITHOUT COMPLICATION, WITHOUT LONG-TERM CURRENT USE OF INSULIN: ICD-10-CM

## 2025-08-11 DIAGNOSIS — E78.5 HYPERLIPIDEMIA, UNSPECIFIED HYPERLIPIDEMIA TYPE: ICD-10-CM

## 2025-08-11 DIAGNOSIS — I10 PRIMARY HYPERTENSION: ICD-10-CM

## 2025-08-12 DIAGNOSIS — I10 PRIMARY HYPERTENSION: ICD-10-CM

## 2025-08-12 DIAGNOSIS — G20.A1 PARKINSON'S DISEASE, UNSPECIFIED WHETHER DYSKINESIA PRESENT, UNSPECIFIED WHETHER MANIFESTATIONS FLUCTUATE: ICD-10-CM

## 2025-08-12 DIAGNOSIS — E78.5 HYPERLIPIDEMIA, UNSPECIFIED HYPERLIPIDEMIA TYPE: ICD-10-CM

## 2025-08-12 DIAGNOSIS — E11.9 TYPE 2 DIABETES MELLITUS WITHOUT COMPLICATION, WITHOUT LONG-TERM CURRENT USE OF INSULIN: ICD-10-CM

## 2025-08-19 DIAGNOSIS — M79.89 LEG SWELLING: ICD-10-CM

## 2025-08-19 DIAGNOSIS — K21.9 GASTROESOPHAGEAL REFLUX DISEASE WITHOUT ESOPHAGITIS: ICD-10-CM

## 2025-08-19 RX ORDER — OMEPRAZOLE 20 MG/1
40 CAPSULE, DELAYED RELEASE ORAL DAILY
Qty: 180 CAPSULE | Refills: 1 | Status: SHIPPED | OUTPATIENT
Start: 2025-08-19

## 2025-08-19 RX ORDER — FUROSEMIDE 20 MG/1
20 TABLET ORAL DAILY
Qty: 90 TABLET | Refills: 1 | Status: SHIPPED | OUTPATIENT
Start: 2025-08-19

## 2025-08-21 ENCOUNTER — PATIENT OUTREACH (OUTPATIENT)
Dept: PRIMARY CARE | Facility: CLINIC | Age: 75
End: 2025-08-21
Payer: MEDICARE

## 2025-08-21 DIAGNOSIS — G20.A1 PARKINSON'S DISEASE, UNSPECIFIED WHETHER DYSKINESIA PRESENT, UNSPECIFIED WHETHER MANIFESTATIONS FLUCTUATE: ICD-10-CM

## 2025-08-21 DIAGNOSIS — E78.5 HYPERLIPIDEMIA, UNSPECIFIED HYPERLIPIDEMIA TYPE: ICD-10-CM

## 2025-08-21 DIAGNOSIS — E11.9 TYPE 2 DIABETES MELLITUS WITHOUT COMPLICATION, WITHOUT LONG-TERM CURRENT USE OF INSULIN: ICD-10-CM

## 2025-08-26 ENCOUNTER — PATIENT OUTREACH (OUTPATIENT)
Dept: PRIMARY CARE | Facility: CLINIC | Age: 75
End: 2025-08-26
Payer: MEDICARE

## 2025-10-24 ENCOUNTER — APPOINTMENT (OUTPATIENT)
Dept: PRIMARY CARE | Facility: CLINIC | Age: 75
End: 2025-10-24
Payer: MEDICARE